# Patient Record
Sex: FEMALE | HISPANIC OR LATINO | Employment: FULL TIME | URBAN - METROPOLITAN AREA
[De-identification: names, ages, dates, MRNs, and addresses within clinical notes are randomized per-mention and may not be internally consistent; named-entity substitution may affect disease eponyms.]

---

## 2020-12-15 ENCOUNTER — OFFICE VISIT (OUTPATIENT)
Dept: URGENT CARE | Age: 40
End: 2020-12-15
Payer: COMMERCIAL

## 2020-12-15 VITALS
BODY MASS INDEX: 24.99 KG/M2 | RESPIRATION RATE: 16 BRPM | TEMPERATURE: 96.5 F | OXYGEN SATURATION: 98 % | HEART RATE: 75 BPM | WEIGHT: 150 LBS | HEIGHT: 65 IN

## 2020-12-15 DIAGNOSIS — B34.9 VIRAL INFECTION: Primary | ICD-10-CM

## 2020-12-15 PROCEDURE — 99213 OFFICE O/P EST LOW 20 MIN: CPT | Performed by: PHYSICIAN ASSISTANT

## 2020-12-15 PROCEDURE — U0003 INFECTIOUS AGENT DETECTION BY NUCLEIC ACID (DNA OR RNA); SEVERE ACUTE RESPIRATORY SYNDROME CORONAVIRUS 2 (SARS-COV-2) (CORONAVIRUS DISEASE [COVID-19]), AMPLIFIED PROBE TECHNIQUE, MAKING USE OF HIGH THROUGHPUT TECHNOLOGIES AS DESCRIBED BY CMS-2020-01-R: HCPCS | Performed by: PHYSICIAN ASSISTANT

## 2020-12-16 LAB — SARS-COV-2 RNA SPEC QL NAA+PROBE: DETECTED

## 2020-12-17 ENCOUNTER — TELEPHONE (OUTPATIENT)
Dept: URGENT CARE | Facility: CLINIC | Age: 40
End: 2020-12-17

## 2023-09-01 ENCOUNTER — ANESTHESIA EVENT (OUTPATIENT)
Dept: PERIOP | Facility: HOSPITAL | Age: 43
End: 2023-09-01
Payer: COMMERCIAL

## 2023-09-01 RX ORDER — CHOLECALCIFEROL (VITAMIN D3) 1250 MCG
1 CAPSULE ORAL WEEKLY
COMMUNITY
Start: 2023-06-15

## 2023-09-01 RX ORDER — DIAPER,BRIEF,ADULT, DISPOSABLE
EACH MISCELLANEOUS
COMMUNITY

## 2023-09-01 RX ORDER — PHENTERMINE HYDROCHLORIDE 37.5 MG/1
37.5 CAPSULE ORAL EVERY MORNING
COMMUNITY

## 2023-09-01 RX ORDER — BUPROPION HYDROCHLORIDE 150 MG/1
150 TABLET ORAL EVERY MORNING
COMMUNITY

## 2023-09-01 NOTE — PRE-PROCEDURE INSTRUCTIONS
Pre-Surgery Instructions:   Medication Instructions   • buPROPion (WELLBUTRIN XL) 150 mg 24 hr tablet May take DOS with sip of water - pt states will be taking after surgery needs to take with food   • Cholecalciferol (Vitamin D3) 1.25 MG (34453 UT) CAPS Stop taking 7 days prior to surgery. • Multiple Vitamins-Minerals (MULTIVITAMIN GUMMIES ADULT PO) Stop taking 7 days prior to surgery. • phentermine 37.5 MG capsule Stop taking 5 days prior to surgery. Pt instructed on use of cleanser -- instructions reviewed with pt - pt did verbalize understanding of these instructions. Pt was seen by PCP at [address] [place] [address] on 8/31/23. Pt with PAT call preferred to speak in [address] [place] [address], however wanted [employer]  on phone line in case of any questions that she may not understand. Used [employer] as needed for PAT call. Medication instructions for day surgery reviewed. Please use only a sip of water to take your instructed medications. Avoid all over the counter vitamins, supplements and NSAIDS for one week prior to surgery per anesthesia guidelines. Tylenol is ok to take as needed. You will receive a call one business day prior to surgery with an arrival time and hospital directions. If your surgery is scheduled on a Monday, the hospital will be calling you on the Friday prior to your surgery. If you have not heard from anyone by 8pm, please call the hospital supervisor through the hospital  at [phone]. Yessica Jiang [phone]). Do not eat or drink anything after midnight the night before your surgery, including candy, mints, lifesavers, or chewing gum. Do not drink alcohol 24hrs before your surgery. Try not to smoke at least 24hrs before your surgery. Follow the pre surgery showering instructions as listed in the [facility] Surgical Experience Booklet” or otherwise provided by your surgeon's office. Do not shave the surgical area 24 hours before surgery.  Do not apply any lotions, creams, including makeup, cologne, deodorant, or perfumes after showering on the day of your surgery. No contact lenses, eye make-up, or artificial eyelashes. Remove nail polish, including gel polish, and any artificial, gel, or acrylic nails if possible. Remove all jewelry including rings and body piercing jewelry. Wear causal clothing that is easy to take on and off. Consider your type of surgery. Keep any valuables, jewelry, piercings at home. Please bring any specially ordered equipment (sling, braces) if indicated. Arrange for a responsible person to drive you to and from the hospital on the day of your surgery. Visitor Guidelines discussed. Call the surgeon's office with any new illnesses, exposures, or additional questions prior to surgery. Please reference your [facility] Surgical Experience Booklet” for additional information to prepare for your upcoming surgery.

## 2023-09-12 PROBLEM — G47.30 SLEEP APNEA: Status: ACTIVE | Noted: 2023-09-12

## 2023-09-12 NOTE — DISCHARGE INSTR - AVS FIRST PAGE
Dr. Ernst Bonds Instructions    1. Take your prescribed medication as directed. 2. Upon arrival at home, lie down and elevate your surgical foot on 2 pillows. 3. Stay off your feet as much as possible for the first 24-48 hours. This is when your feet first swell and may become painful. After 48 hours you may begin following these restrictions:   Nonweightbearing to surgical foot    4. Drink large quantities of water. Consume no alcohol. Continue a well-balanced diet. 5. Report any unusual discomfort or fever to this office. 6. A limited amount of discomfort and swelling is to be expected. In some cases the skin may take on a bruised appearance. The surgical cleansing solution that was applied to your foot prior to the operation is dark in color and the operation site may appear to be oozing when it actually is not. 7. A slight amount of blood is to be expected, and is no cause for alarm. Do not remove the dressings. If there is active bleeding and if the bleeding persists, add additional gauze to the bandage, apply direct pressure, elevate your feet and call this office. 8. Do not get the dressings wet. As regular bathing may be inconvenient, sponge baths are recommended. 9. When anesthesia wears off and if any discomfort should be present, apply an ice pack directly over the operated area for 15 minute intervals for several hours or until the pain leaves. (USE IN EXCESS OF 15 MINUTES COULD CAUSE FROSTBITE). Do not use hot water bags or electric pads. A convenient icepack can be made by placing ice cubes in a plastic bag and covering this with a towel. 10. Take over-the-counter laxative for constipation, this is common with use of narcotic medications.

## 2023-09-13 ENCOUNTER — ANESTHESIA (OUTPATIENT)
Dept: PERIOP | Facility: HOSPITAL | Age: 43
End: 2023-09-13
Payer: COMMERCIAL

## 2023-09-13 ENCOUNTER — HOSPITAL ENCOUNTER (OUTPATIENT)
Facility: HOSPITAL | Age: 43
Setting detail: OUTPATIENT SURGERY
Discharge: HOME | End: 2023-09-13
Attending: PODIATRIST | Admitting: PODIATRIST
Payer: COMMERCIAL

## 2023-09-13 VITALS
TEMPERATURE: 96 F | OXYGEN SATURATION: 98 % | HEART RATE: 61 BPM | RESPIRATION RATE: 18 BRPM | WEIGHT: 159 LBS | DIASTOLIC BLOOD PRESSURE: 79 MMHG | SYSTOLIC BLOOD PRESSURE: 143 MMHG | HEIGHT: 65 IN | BODY MASS INDEX: 26.49 KG/M2

## 2023-09-13 DIAGNOSIS — Z98.890 POST-OPERATIVE STATE: Primary | ICD-10-CM

## 2023-09-13 LAB
EXT PREGNANCY TEST URINE: NEGATIVE
EXT. CONTROL: NORMAL

## 2023-09-13 PROCEDURE — 81025 URINE PREGNANCY TEST: CPT | Performed by: PODIATRIST

## 2023-09-13 RX ORDER — LIDOCAINE HYDROCHLORIDE 20 MG/ML
INJECTION, SOLUTION EPIDURAL; INFILTRATION; INTRACAUDAL; PERINEURAL AS NEEDED
Status: DISCONTINUED | OUTPATIENT
Start: 2023-09-13 | End: 2023-09-13

## 2023-09-13 RX ORDER — FENTANYL CITRATE 50 UG/ML
INJECTION, SOLUTION INTRAMUSCULAR; INTRAVENOUS
Status: COMPLETED | OUTPATIENT
Start: 2023-09-13 | End: 2023-09-13

## 2023-09-13 RX ORDER — PROPOFOL 10 MG/ML
INJECTION, EMULSION INTRAVENOUS CONTINUOUS PRN
Status: DISCONTINUED | OUTPATIENT
Start: 2023-09-13 | End: 2023-09-13

## 2023-09-13 RX ORDER — FENTANYL CITRATE 50 UG/ML
INJECTION, SOLUTION INTRAMUSCULAR; INTRAVENOUS AS NEEDED
Status: DISCONTINUED | OUTPATIENT
Start: 2023-09-13 | End: 2023-09-13

## 2023-09-13 RX ORDER — DEXAMETHASONE SODIUM PHOSPHATE 10 MG/ML
INJECTION, SOLUTION INTRAMUSCULAR; INTRAVENOUS AS NEEDED
Status: DISCONTINUED | OUTPATIENT
Start: 2023-09-13 | End: 2023-09-13

## 2023-09-13 RX ORDER — ROPIVACAINE HYDROCHLORIDE 5 MG/ML
INJECTION, SOLUTION EPIDURAL; INFILTRATION; PERINEURAL
Status: COMPLETED | OUTPATIENT
Start: 2023-09-13 | End: 2023-09-13

## 2023-09-13 RX ORDER — MIDAZOLAM HYDROCHLORIDE 2 MG/2ML
INJECTION, SOLUTION INTRAMUSCULAR; INTRAVENOUS
Status: COMPLETED | OUTPATIENT
Start: 2023-09-13 | End: 2023-09-13

## 2023-09-13 RX ORDER — CEFAZOLIN SODIUM 2 G/50ML
2000 SOLUTION INTRAVENOUS ONCE
Status: COMPLETED | OUTPATIENT
Start: 2023-09-13 | End: 2023-09-13

## 2023-09-13 RX ORDER — MAGNESIUM HYDROXIDE 1200 MG/15ML
LIQUID ORAL AS NEEDED
Status: DISCONTINUED | OUTPATIENT
Start: 2023-09-13 | End: 2023-09-13 | Stop reason: HOSPADM

## 2023-09-13 RX ORDER — LIDOCAINE HYDROCHLORIDE 10 MG/ML
INJECTION, SOLUTION EPIDURAL; INFILTRATION; INTRACAUDAL; PERINEURAL AS NEEDED
Status: DISCONTINUED | OUTPATIENT
Start: 2023-09-13 | End: 2023-09-13 | Stop reason: HOSPADM

## 2023-09-13 RX ORDER — SODIUM CHLORIDE, SODIUM LACTATE, POTASSIUM CHLORIDE, CALCIUM CHLORIDE 600; 310; 30; 20 MG/100ML; MG/100ML; MG/100ML; MG/100ML
50 INJECTION, SOLUTION INTRAVENOUS CONTINUOUS
Status: DISCONTINUED | OUTPATIENT
Start: 2023-09-13 | End: 2023-09-13 | Stop reason: HOSPADM

## 2023-09-13 RX ORDER — TRIAMCINOLONE ACETONIDE 40 MG/ML
INJECTION, SUSPENSION INTRA-ARTICULAR; INTRAMUSCULAR AS NEEDED
Status: DISCONTINUED | OUTPATIENT
Start: 2023-09-13 | End: 2023-09-13 | Stop reason: HOSPADM

## 2023-09-13 RX ORDER — PROPOFOL 10 MG/ML
INJECTION, EMULSION INTRAVENOUS AS NEEDED
Status: DISCONTINUED | OUTPATIENT
Start: 2023-09-13 | End: 2023-09-13

## 2023-09-13 RX ORDER — ACETAMINOPHEN 325 MG/1
650 TABLET ORAL EVERY 4 HOURS PRN
Status: DISCONTINUED | OUTPATIENT
Start: 2023-09-13 | End: 2023-09-13 | Stop reason: HOSPADM

## 2023-09-13 RX ORDER — CHLORHEXIDINE GLUCONATE ORAL RINSE 1.2 MG/ML
15 SOLUTION DENTAL ONCE
Status: COMPLETED | OUTPATIENT
Start: 2023-09-13 | End: 2023-09-13

## 2023-09-13 RX ORDER — ONDANSETRON 2 MG/ML
INJECTION INTRAMUSCULAR; INTRAVENOUS AS NEEDED
Status: DISCONTINUED | OUTPATIENT
Start: 2023-09-13 | End: 2023-09-13

## 2023-09-13 RX ORDER — OXYCODONE HYDROCHLORIDE AND ACETAMINOPHEN 5; 325 MG/1; MG/1
1 TABLET ORAL EVERY 4 HOURS PRN
Status: DISCONTINUED | OUTPATIENT
Start: 2023-09-13 | End: 2023-09-13 | Stop reason: HOSPADM

## 2023-09-13 RX ADMIN — DEXAMETHASONE SODIUM PHOSPHATE 10 MG: 10 INJECTION, SOLUTION INTRAMUSCULAR; INTRAVENOUS at 09:35

## 2023-09-13 RX ADMIN — LIDOCAINE HYDROCHLORIDE 80 MG: 20 INJECTION, SOLUTION EPIDURAL; INFILTRATION; INTRACAUDAL; PERINEURAL at 08:56

## 2023-09-13 RX ADMIN — ONDANSETRON 4 MG: 2 INJECTION INTRAMUSCULAR; INTRAVENOUS at 09:35

## 2023-09-13 RX ADMIN — SODIUM CHLORIDE, SODIUM LACTATE, POTASSIUM CHLORIDE, AND CALCIUM CHLORIDE 50 ML/HR: .6; .31; .03; .02 INJECTION, SOLUTION INTRAVENOUS at 08:01

## 2023-09-13 RX ADMIN — FENTANYL CITRATE 50 MCG: 50 INJECTION, SOLUTION INTRAMUSCULAR; INTRAVENOUS at 09:15

## 2023-09-13 RX ADMIN — MIDAZOLAM 2 MG: 1 INJECTION INTRAMUSCULAR; INTRAVENOUS at 08:20

## 2023-09-13 RX ADMIN — FENTANYL CITRATE 50 MCG: 50 INJECTION, SOLUTION INTRAMUSCULAR; INTRAVENOUS at 08:20

## 2023-09-13 RX ADMIN — PROPOFOL 80 MCG/KG/MIN: 10 INJECTION, EMULSION INTRAVENOUS at 08:56

## 2023-09-13 RX ADMIN — CEFAZOLIN SODIUM 2000 MG: 2 SOLUTION INTRAVENOUS at 08:56

## 2023-09-13 RX ADMIN — ROPIVACAINE HYDROCHLORIDE 20 ML: 5 INJECTION EPIDURAL; INFILTRATION; PERINEURAL at 08:25

## 2023-09-13 RX ADMIN — CHLORHEXIDINE GLUCONATE 0.12% ORAL RINSE 15 ML: 1.2 LIQUID ORAL at 07:53

## 2023-09-13 RX ADMIN — OXYCODONE HYDROCHLORIDE AND ACETAMINOPHEN 1 TABLET: 5; 325 TABLET ORAL at 10:22

## 2023-09-13 RX ADMIN — PROPOFOL 40 MG: 10 INJECTION, EMULSION INTRAVENOUS at 08:56

## 2023-09-13 NOTE — INTERVAL H&P NOTE
H&P reviewed. After examining the patient I find no changes in the patients condition since the H&P had been written.     Vitals:    09/13/23 0734   BP: 125/81   Pulse: 74   Resp: 18   Temp: 98.1 °F (36.7 °C)   SpO2: 100%

## 2023-09-13 NOTE — ANESTHESIA PREPROCEDURE EVALUATION
Procedure:  RECESSION GASTROCNEMIUS OPEN (Right: Leg Lower)  RELEASE FASCIA PLANTAR/FASCIOTOMY OPEN (Right: Foot)    Relevant Problems   CARDIO (within normal limits)      PULMONARY   (+) Sleep apnea   (-) Asthma   (-) Smoking        Physical Exam    Airway    Mallampati score: II  TM Distance: >3 FB  Neck ROM: full     Dental       Cardiovascular  Cardiovascular exam normal    Pulmonary  Pulmonary exam normal     Other Findings        Anesthesia Plan  ASA Score- 2     Anesthesia Type- IV sedation with anesthesia with ASA Monitors. Additional Monitors:   Airway Plan:     Comment: Pop block . Plan Factors-Exercise tolerance (METS): >4 METS. Chart reviewed. EKG reviewed. Existing labs reviewed. Patient summary reviewed. Patient is not a current smoker. Patient not instructed to abstain from smoking on day of procedure. Patient did not smoke on day of surgery. Induction- intravenous. Postoperative Plan- Plan for postoperative opioid use. Informed Consent- Anesthetic plan and risks discussed with patient and spouse. I personally reviewed this patient with the CRNA. Discussed and agreed on the Anesthesia Plan with the CRNA. .        Lab Results   Component Value Date    HGBA1C 5.6 10/05/2022       No results found for: "NA", "K", "CL", "CO2", "ANIONGAP", "BUN", "CREATININE", "GLUCOSE", "GLUF", "CALCIUM", "CORRECTEDCA", "AST", "ALT", "ALKPHOS", "PROT", "BILITOT", "EGFR"      Cbc and bmp reviewed

## 2023-09-13 NOTE — DISCHARGE SUMMARY
Discharge Summary Outpatient Procedure Podiatry -   Mylene Reeves 39 y.o. female MRN: [id number]  Unit/Bed#: OR POOL Encounter: [id number]    Admission Date: 9/13/2023     Admitting Diagnosis: Plantar fascial fibromatosis [M72.2]  Contracture, right ankle [M24.571]    Discharge Diagnosis: same    Procedures Performed: RECESSION GASTROCNEMIUS OPEN: 17003 (CPT®)  RELEASE FASCIA PLANTAR/FASCIOTOMY OPEN:     Complications: none    Condition at Discharge: stable    Discharge instructions/Information to patient and family:   See after visit summary for information provided to patient and family. Provisions for Follow-Up Care/Important appointments:  See after visit summary for information related to follow-up care and any pertinent home health orders. Discharge Medications:  See after visit summary for reconciled discharge medications provided to patient and family.

## 2023-09-13 NOTE — ANESTHESIA PROCEDURE NOTES
Peripheral Block    Patient location during procedure: holding area  Start time: 9/13/2023 8:15 AM  Reason for block: at surgeon's request and post-op pain management  Staffing  Performed by: Chani Fragoso MD  Authorized by: Chani Fragoso MD    Preanesthetic Checklist  Completed: patient identified, IV checked, site marked, risks and benefits discussed, surgical consent, monitors and equipment checked, pre-op evaluation and timeout performed  Peripheral Block  Patient position: prone. Prep: ChloraPrep  Patient monitoring: frequent blood pressure checks, continuous pulse oximetry and heart rate  Block type: Popliteal  Laterality: right  Injection technique: single-shot  Procedures: ultrasound guided, Ultrasound guidance required for the procedure to increase accuracy and safety of medication placement and decrease risk of complications.   Ultrasound permanent image savedropivacaine (NAROPIN) 0.5 % injection 20 mL - Perineural   20 mL - 9/13/2023 8:25:00 AM  fentanyl citrate (PF) 100 MCG/2ML 50 mcg - Intravenous   50 mcg - 9/13/2023 8:20:00 AM  midazolam (VERSED) injection 0.5 mg - Intravenous   2 mg - 9/13/2023 8:20:00 AM  Needle  Needle type: Stimuplex   Needle gauge: 20 G  Needle localization: anatomical landmarks and ultrasound guidance  Assessment  Injection assessment: incremental injection, frequent aspiration, injected with ease, negative aspiration, negative for heart rate change, no paresthesia on injection, no symptoms of intraneural/intravenous injection and needle tip visualized at all times  Paresthesia pain: none  Post-procedure:  site cleaned  patient tolerated the procedure well with no immediate complications

## 2023-09-13 NOTE — OP NOTE
OPERATIVE REPORT - Podiatry  PATIENT NAME: Danilo Samaniego    :  1980  MRN: [id number]  Pt Location:  OR ROOM 12    SURGERY DATE: 2023    Surgeon(s) and Role:     * Reba Ojeda DPM - Primary     * Juvenal Cordero DPM - Assisting    Pre-op Diagnosis:  Plantar fascial fibromatosis [M72.2]  Contracture, right ankle [M24.571]    Post-Op Diagnosis Codes:     * Plantar fascial fibromatosis [M72.2]     * Contracture, right ankle [M24.571]    Procedure(s) (LRB):  RECESSION GASTROCNEMIUS OPEN (Right)  RELEASE FASCIA PLANTAR/FASCIOTOMY OPEN (Right)    Specimen(s):  * No specimens in log *    Estimated Blood Loss:   Minimal    Drains:  * No LDAs found *    Anesthesia Type:   General w/ Popliteal Block with 10 ml of 1% Lidocaine    Hemostasis:  Pneumatic thigh tourniquet set at 300 mmHg for 28 mins  Direct compression, electrocautery    Materials:  3-0 Vicryl  4-0 nylon    Injectables:  None    Operative Findings: Following gastroc recession there was noted adequate reduction and equinus deformity    Complications:   None    Procedure and Technique:   A popliteal block was performed by anesthesia team prior to the OR. Under mild sedation, the patient was brought into the operating room and placed on the operating room table in the supine position. IV sedation was achieved by anesthesia team and a universal timeout was performed where all parties are in agreement of correct patient, correct procedure and correct site. A pneumatic tourniquet was then placed over the patient's right lower extremity with ample padding. An esmarch bandage was used to exsangunate the foot and the pneumatic tourniquet was then inflated to 300 mmHg. The foot was then prepped and draped in the usual aseptic manner. Attention was then directed to right medial calf. Using a 15 blade a 6 cm incision was made over the central medial aspect of the cath through the skin.   Blunt dissection was then carried through the subcutaneous tissue down to the level of deep fascia, retracting all vital neurovascular structures on the way and cauterizing any deep bleeding vessels. The fascia was then incised using a 15 blade to expose the gastrocnemius and soleal complex. Using a saline soaked Ray-Tenisha to the aponeurosis between the gastroc and soleus muscle was identified and  to allow for visualization of the myotendinous junction. A laminar  was then used to retract both muscle bellies out of the way and allow for visualization of the myotendinous junction. Using a 15 blade the myotendinous junction of the gastroc and soleus complex was incised and released. The foot was then placed in a dorsiflexed position and reduction of the equinus deformity was noted. The surgical incision was copiously flushed with NSS. Deep closure was achieved using 3-0 Vicryl. Subcuticular closure was done with 3-0 Vicryl. Skin closure was achieved with 4-0 nylon in a running interlocking fashion. Attention was then directed to the right heel where a 2.5 cm incision was effected in a transverse manner through full skin thickness with a #15 blade within relaxed skin tension lines anterior to the heel tuber over the medial course of the plantar aponeurosis. Further dissection through subcutaneous tissue down to the plantar fascial band was obtained utilizing Metzenbaum scissors. With adequate retraction, the medial plantar aponeurosis was brought into view and was incised with a #15 blade down to muscle belly. The medial aponeurosis was noted to be hypertrophied consistent with plantar fasciitis. A portion of the proximal section was clamped and resected for fasciectomy. Preoperative tension along the medial band was noted to be resolved. The wound was copiously irrigated with normal sterile saline solution. Closure was achieved with 4-0 nylon interrupted horizontal.     The foot was then cleansed and dried.  A postoperative injection consisting of 10 ml of 1% Lidocaine with kenalog 40 was performed. The incision site was dressed with betadine soaked adaptic, gauze. This was then covered with a Kerlex, webril and an ACE wrap then webril and another ACE wrap. The tourniquet was deflated at approximately 28 min and normal hyperemic response was noted to all digits. The patient tolerated the procedure and anesthesia well without immediate complications and transferred to PACU with vital signs stable. Dr. Danielle Ortega was present during the entire procedure and participated in all key aspects. SIGNATURE: Sandy Fair DPM  DATE: September 13, 2023  TIME: 9:56 AM      Portions of the record may have been created with voice recognition software. Occasional wrong word or "sound a like" substitutions may have occurred due to the inherent limitations of voice recognition software. Read the chart carefully and recognize, using context, where substitutions have occurred.

## 2024-02-05 ENCOUNTER — HOSPITAL ENCOUNTER (EMERGENCY)
Facility: HOSPITAL | Age: 44
Discharge: HOME | End: 2024-02-05
Attending: EMERGENCY MEDICINE | Admitting: EMERGENCY MEDICINE
Payer: COMMERCIAL

## 2024-02-05 VITALS
DIASTOLIC BLOOD PRESSURE: 96 MMHG | SYSTOLIC BLOOD PRESSURE: 173 MMHG | OXYGEN SATURATION: 100 % | WEIGHT: 162.92 LBS | BODY MASS INDEX: 27.11 KG/M2 | HEART RATE: 88 BPM | RESPIRATION RATE: 18 BRPM | TEMPERATURE: 98.2 F

## 2024-02-05 DIAGNOSIS — R03.0 ELEVATED BLOOD PRESSURE READING: Primary | ICD-10-CM

## 2024-02-05 PROCEDURE — 99283 EMERGENCY DEPT VISIT LOW MDM: CPT

## 2024-02-05 PROCEDURE — 99283 EMERGENCY DEPT VISIT LOW MDM: CPT | Performed by: EMERGENCY MEDICINE

## 2024-02-05 NOTE — ED PROVIDER NOTES
"History  Chief Complaint   Patient presents with    Hypertension     Pt reports HBP reading at podiatrist today.  Has been having faint headaches recently.  Denies other symptoms.      44 y.o. F p/w high BP reading.  Pt was at podiatrist's office today and BP was in 150s and rechecked in 160s.  Reports intermittent \"little\" headaches. Denies CP, changes in vision, SOB, abd pain, N/V, focal deficits.  Pt does not have a PCP.      History provided by:  Patient and relative  Hypertension  Associated symptoms: headaches (\"sometimes\")    Associated symptoms: no abdominal pain, no chest pain, no nausea, no shortness of breath and not vomiting        Prior to Admission Medications   Prescriptions Last Dose Informant Patient Reported? Taking?   Cholecalciferol (Vitamin D3) 1.25 MG (57451 UT) CAPS   Yes No   Sig: Take 1 capsule by mouth once a week   Lecithin 1200 MG CAPS   Yes No   Sig: Take by mouth   Multiple Vitamins-Minerals (MULTIVITAMIN GUMMIES ADULT PO)   Yes No   Sig: Take by mouth daily   buPROPion (WELLBUTRIN XL) 150 mg 24 hr tablet   Yes No   Sig: Take 150 mg by mouth every morning   phentermine 37.5 MG capsule   Yes No   Sig: Take 37.5 mg by mouth every morning 9/1/23 Pt instructed to hold 5 days prior to surgery      Facility-Administered Medications: None       Past Medical History:   Diagnosis Date    Asthma     childhood asthma -resolved at this time    CPAP (continuous positive airway pressure) dependence     Sleep apnea        Past Surgical History:   Procedure Laterality Date    CHOLECYSTECTOMY      PLANTAR FASCIA SURGERY Right 9/13/2023    Procedure: RELEASE FASCIA PLANTAR/FASCIOTOMY OPEN;  Surgeon: Paxton Malloy DPM;  Location:  MAIN OR;  Service: Podiatry    GA GASTROCNEMIUS RECESSION Right 9/13/2023    Procedure: RECESSION GASTROCNEMIUS OPEN;  Surgeon: Paxton Malloy DPM;  Location:  MAIN OR;  Service: Podiatry       Family History   Problem Relation Age of Onset    Anesthesia problems Neg Hx  " "    I have reviewed and agree with the history as documented.    E-Cigarette/Vaping    Comments Denies any use per pt      E-Cigarette/Vaping Substances     Social History     Tobacco Use    Smoking status: Never    Smokeless tobacco: Never    Tobacco comments:     Never a smoker or use of any tobacco products    Substance Use Topics    Alcohol use: Yes     Comment: social -       Review of Systems   Eyes:  Negative for photophobia and visual disturbance.   Respiratory:  Negative for shortness of breath.    Cardiovascular:  Negative for chest pain.   Gastrointestinal:  Negative for abdominal pain, nausea and vomiting.   Neurological:  Positive for headaches (\"sometimes\").       Physical Exam  Physical Exam  Vitals and nursing note reviewed.   Constitutional:       General: She is not in acute distress.     Appearance: She is well-developed. She is not ill-appearing, toxic-appearing or diaphoretic.   HENT:      Head: Normocephalic and atraumatic.   Eyes:      General: No scleral icterus.     Conjunctiva/sclera:      Right eye: Right conjunctiva is not injected.      Left eye: Left conjunctiva is not injected.   Neck:      Vascular: No JVD.      Trachea: Trachea normal.   Cardiovascular:      Rate and Rhythm: Normal rate and regular rhythm.      Pulses: Normal pulses.      Heart sounds: Normal heart sounds. No murmur heard.     No friction rub.   Pulmonary:      Effort: Pulmonary effort is normal. No accessory muscle usage or respiratory distress.      Breath sounds: Normal breath sounds. No stridor. No wheezing, rhonchi or rales.   Chest:      Chest wall: No tenderness.   Abdominal:      General: There is no distension.      Palpations: Abdomen is soft. Abdomen is not rigid.      Tenderness: There is no abdominal tenderness. There is no guarding or rebound.   Musculoskeletal:      Cervical back: Normal range of motion.   Skin:     General: Skin is warm and dry.      Coloration: Skin is not pale.      Findings: No " rash.   Neurological:      Mental Status: She is alert.      GCS: GCS eye subscore is 4. GCS verbal subscore is 5. GCS motor subscore is 6.      Motor: No weakness (Grossly intact).         Vital Signs  ED Triage Vitals [02/05/24 1714]   Temperature Pulse Respirations Blood Pressure SpO2   98.2 °F (36.8 °C) 88 18 (!) 173/96 100 %      Temp Source Heart Rate Source Patient Position - Orthostatic VS BP Location FiO2 (%)   Oral Monitor Lying Left arm --      Pain Score       --           Vitals:    02/05/24 1714   BP: (!) 173/96   Pulse: 88   Patient Position - Orthostatic VS: Lying         Visual Acuity      ED Medications  Medications - No data to display    Diagnostic Studies  Results Reviewed       None                   No orders to display              Procedures  Procedures         ED Course                                             Medical Decision Making  Elevated BP readings - Will refer to family medicine for f/u.             Disposition  Final diagnoses:   Elevated blood pressure reading     Time reflects when diagnosis was documented in both MDM as applicable and the Disposition within this note       Time User Action Codes Description Comment    2/5/2024  6:22 PM Gracia Parikh Add [R03.0] Elevated blood pressure reading           ED Disposition       ED Disposition   Discharge    Condition   Stable    Date/Time   Mon Feb 5, 2024  6:23 PM    Comment   Nancy Marr discharge to home/self care.                   Follow-up Information       Follow up With Specialties Details Why Contact Info Additional Information    [facility] Schedule an appointment as soon as possible for a visit   [address] [place] [id number] [phone] [facility], [address] [place] [address], [address] [phone]            Patient's Medications   Discharge Prescriptions    No  medications on file           PDMP Review         Value Time User    PDMP Reviewed  Yes 9/13/2023  9:53 AM Lam Elizondo DPM            ED Provider  Electronically Signed by             Gracia Parikh DO  02/05/24 6211

## 2024-02-21 ENCOUNTER — OFFICE VISIT (OUTPATIENT)
Dept: FAMILY MEDICINE CLINIC | Facility: CLINIC | Age: 44
End: 2024-02-21
Payer: COMMERCIAL

## 2024-02-21 VITALS
BODY MASS INDEX: 26.49 KG/M2 | SYSTOLIC BLOOD PRESSURE: 130 MMHG | HEIGHT: 65 IN | HEART RATE: 96 BPM | RESPIRATION RATE: 16 BRPM | WEIGHT: 159 LBS | OXYGEN SATURATION: 98 % | DIASTOLIC BLOOD PRESSURE: 88 MMHG | TEMPERATURE: 98 F

## 2024-02-21 DIAGNOSIS — Z00.00 HEALTHCARE MAINTENANCE: ICD-10-CM

## 2024-02-21 DIAGNOSIS — R03.0 ELEVATED BLOOD PRESSURE READING IN OFFICE WITHOUT DIAGNOSIS OF HYPERTENSION: Primary | ICD-10-CM

## 2024-02-21 DIAGNOSIS — R03.0 ELEVATED BLOOD PRESSURE READING: ICD-10-CM

## 2024-02-21 PROCEDURE — 99396 PREV VISIT EST AGE 40-64: CPT | Performed by: NURSE PRACTITIONER

## 2024-02-21 PROCEDURE — 99203 OFFICE O/P NEW LOW 30 MIN: CPT | Performed by: NURSE PRACTITIONER

## 2024-02-21 NOTE — PROGRESS NOTES
Name: Nancy Marr      : 1980      MRN: [id number]  Encounter Provider: ANJU Moscoso  Encounter Date: 2024   Encounter department: [facility]    Assessment & Plan     1. Elevated blood pressure reading in office without diagnosis of hypertension  Assessment & Plan:  - Advised to monitor blood pressure at home and record readings.   - Recommend regular exercise and healthy diet low in sodium.  - Recommend follow up in 1 month.     Orders:  -     CBC and differential; Future  -     Comprehensive metabolic panel; Future  -     Lipid Panel with Direct LDL reflex; Future  -     TSH, 3rd generation with Free T4 reflex; Future    2. Healthcare maintenance  Assessment & Plan:  - Reviewed immunizations and screenings.  - UTD with dental, vision, and GYN exams.   - UTD with mammogram.   - Routine labs ordered.     Orders:  -     CBC and differential; Future  -     Comprehensive metabolic panel; Future  -     Lipid Panel with Direct LDL reflex; Future  -     TSH, 3rd generation with Free T4 reflex; Future    3. Elevated blood pressure reading  -     Ambulatory Referral to Family Practice        Depression Screening and Follow-up Plan: Patient was screened for depression during today's encounter. They screened negative with a PHQ-2 score of 0.        Subjective     Patient presents to office today to establish care. She has no significant PMH. Currently taking phentermine and wellbutrin for weight loss. She has concerns today regarding elevated blood pressure readings. Was being seen by Podiatry for plantar fasciitis and BP was elevated during office visit. She has been checking her blood pressure at home and reports readings of 120-130s systolic and 80-90s diastolic. Also complains of headaches which usually occur after she gets home from work. Denies any sensitivity to light or sound. Takes Advil OTC occasionally which provides relief. She denies any other concerns or  complaints today.           Review of Systems   Constitutional:  Negative for fatigue and fever.   HENT:  Negative for congestion, postnasal drip and trouble swallowing.    Eyes:  Negative for visual disturbance.   Respiratory:  Negative for cough and shortness of breath.    Cardiovascular:  Negative for chest pain and palpitations.   Gastrointestinal:  Negative for abdominal pain and blood in stool.   Endocrine: Negative for cold intolerance and heat intolerance.   Genitourinary:  Negative for difficulty urinating, dysuria and frequency.   Musculoskeletal:  Negative for gait problem.   Skin:  Negative for rash.   Neurological:  Positive for headaches. Negative for dizziness and syncope.   Hematological:  Negative for adenopathy.   Psychiatric/Behavioral:  Negative for behavioral problems.        Past Medical History:   Diagnosis Date   • Asthma     childhood asthma -resolved at this time   • CPAP (continuous positive airway pressure) dependence    • Sleep apnea      Past Surgical History:   Procedure Laterality Date   • CHOLECYSTECTOMY     • PLANTAR FASCIA SURGERY Right 9/13/2023    Procedure: RELEASE FASCIA PLANTAR/FASCIOTOMY OPEN;  Surgeon: Paxton Malloy DPM;  Location: [facility];  Service: Podiatry   • [place] GASTROCNEMIUS RECESSION Right 9/13/2023    Procedure: RECESSION GASTROCNEMIUS OPEN;  Surgeon: Paxton Malloy DPM;  Location:  MAIN OR;  Service: Podiatry     Family History   Problem Relation Age of Onset   • Anesthesia problems Neg Hx      Social History     Socioeconomic History   • Marital status: /Civil Union     Spouse name: None   • Number of children: None   • Years of education: None   • Highest education level: None   Occupational History   • None   Tobacco Use   • Smoking status: Never   • Smokeless tobacco: Never   • Tobacco comments:     Never a smoker or use of any tobacco products    Vaping Use   • Vaping status: Never Used   Substance and Sexual Activity   • Alcohol use: Yes     Comment:  "social -   • Drug use: Never     Comment: Denies any drug use per pt   • Sexual activity: Yes     Comment: Denies any chest pain or shortness of breath with activity   Other Topics Concern   • None   Social History Narrative   • None     Social Determinants of Health     Financial Resource Strain: Not on file   Food Insecurity: Not on file   Transportation Needs: Not on file   Physical Activity: Not on file   Stress: Not on file   Social Connections: Not on file   Intimate Partner Violence: Not on file   Housing Stability: Not on file     Current Outpatient Medications on File Prior to Visit   Medication Sig   • buPROPion (WELLBUTRIN XL) 150 mg 24 hr tablet Take 150 mg by mouth every morning   • Cholecalciferol (Vitamin D3) 1.25 MG (94277 UT) CAPS Take 1 capsule by mouth once a week   • phentermine 37.5 MG capsule Take 37.5 mg by mouth every morning 9/1/23 Pt instructed to hold 5 days prior to surgery   • Lecithin 1200 MG CAPS Take by mouth   • Multiple Vitamins-Minerals (MULTIVITAMIN GUMMIES ADULT PO) Take by mouth daily (Patient not taking: Reported on 2/21/2024)     Allergies   Allergen Reactions   • Dipyrone Other (See Comments)     Facial swelling. NB: this is a NSAID that causes agranulcytosis and is not used anymore.     Immunization History   Administered Date(s) Administered   • COVID-19 J&J (BackType) vaccine 0.5 mL 04/06/2021   • COVID-19 PFIZER VACCINE 0.3 ML IM 12/30/2021   • HPV9 07/07/2023   • Hep B, adult 05/06/2023, 06/27/2023   • INFLUENZA 11/12/2012, 10/15/2013, 11/13/2014, 10/14/2015, 10/14/2015, 10/08/2022   • Influenza, injectable, quadrivalent, preservative free 0.5 mL 09/01/2020   • Tdap 06/20/2022       Objective     /88 (BP Location: Left arm, Patient Position: Sitting, Cuff Size: Adult)   Pulse 96   Temp 98 °F (36.7 °C) (Tympanic)   Resp 16   Ht 5' 5\" (1.651 m)   Wt 72.1 kg (159 lb)   SpO2 98%   BMI 26.46 kg/m²     Physical Exam  Vitals and nursing note reviewed. "   Constitutional:       General: She is not in acute distress.     Appearance: Normal appearance. She is not ill-appearing.   HENT:      Head: Normocephalic and atraumatic.      Right Ear: Tympanic membrane, ear canal and external ear normal.      Left Ear: Tympanic membrane, ear canal and external ear normal.      Nose: Nose normal.      Mouth/Throat:      Mouth: Mucous membranes are moist.      Pharynx: No posterior oropharyngeal erythema.   Eyes:      Conjunctiva/sclera: Conjunctivae normal.      Pupils: Pupils are equal, round, and reactive to light.   Cardiovascular:      Rate and Rhythm: Normal rate and regular rhythm.      Heart sounds: Normal heart sounds.   Pulmonary:      Effort: Pulmonary effort is normal.      Breath sounds: Normal breath sounds.   Abdominal:      General: Bowel sounds are normal.      Palpations: Abdomen is soft.   Musculoskeletal:         General: Normal range of motion.      Cervical back: Normal range of motion.   Lymphadenopathy:      Cervical: No cervical adenopathy.   Skin:     General: Skin is warm and dry.   Neurological:      Mental Status: She is alert and oriented to person, place, and time.   Psychiatric:         Mood and Affect: Mood normal.         Behavior: Behavior normal.       ANJU Moscoso

## 2024-02-21 NOTE — ASSESSMENT & PLAN NOTE
- Advised to monitor blood pressure at home and record readings.   - Recommend regular exercise and healthy diet low in sodium.  - Recommend follow up in 1 month.

## 2024-02-21 NOTE — ASSESSMENT & PLAN NOTE
- Reviewed immunizations and screenings.  - UTD with dental, vision, and GYN exams.   - UTD with mammogram.   - Routine labs ordered.

## 2024-02-28 ENCOUNTER — APPOINTMENT (OUTPATIENT)
Dept: LAB | Age: 44
End: 2024-02-28
Payer: COMMERCIAL

## 2024-02-28 DIAGNOSIS — R03.0 ELEVATED BLOOD PRESSURE READING IN OFFICE WITHOUT DIAGNOSIS OF HYPERTENSION: ICD-10-CM

## 2024-02-28 DIAGNOSIS — Z00.00 HEALTHCARE MAINTENANCE: ICD-10-CM

## 2024-02-28 LAB
ALBUMIN SERPL BCP-MCNC: 4.1 G/DL (ref 3.5–5)
ALP SERPL-CCNC: 55 U/L (ref 34–104)
ALT SERPL W P-5'-P-CCNC: 15 U/L (ref 7–52)
ANION GAP SERPL CALCULATED.3IONS-SCNC: 7 MMOL/L
AST SERPL W P-5'-P-CCNC: 16 U/L (ref 13–39)
BASOPHILS # BLD AUTO: 0.05 THOUSANDS/ÂΜL (ref 0–0.1)
BASOPHILS NFR BLD AUTO: 1 % (ref 0–1)
BILIRUB SERPL-MCNC: 0.57 MG/DL (ref 0.2–1)
BUN SERPL-MCNC: 7 MG/DL (ref 5–25)
CALCIUM SERPL-MCNC: 8.8 MG/DL (ref 8.4–10.2)
CHLORIDE SERPL-SCNC: 105 MMOL/L (ref 96–108)
CHOLEST SERPL-MCNC: 194 MG/DL
CO2 SERPL-SCNC: 27 MMOL/L (ref 21–32)
CREAT SERPL-MCNC: 0.72 MG/DL (ref 0.6–1.3)
EOSINOPHIL # BLD AUTO: 0.12 THOUSAND/ÂΜL (ref 0–0.61)
EOSINOPHIL NFR BLD AUTO: 2 % (ref 0–6)
ERYTHROCYTE [DISTWIDTH] IN BLOOD BY AUTOMATED COUNT: 12.3 % (ref 11.6–15.1)
GFR SERPL CREATININE-BSD FRML MDRD: 101 ML/MIN/1.73SQ M
GLUCOSE P FAST SERPL-MCNC: 81 MG/DL (ref 65–99)
HCT VFR BLD AUTO: 42.2 % (ref 34.8–46.1)
HDLC SERPL-MCNC: 46 MG/DL
HGB BLD-MCNC: 13.4 G/DL (ref 11.5–15.4)
IMM GRANULOCYTES # BLD AUTO: 0.03 THOUSAND/UL (ref 0–0.2)
IMM GRANULOCYTES NFR BLD AUTO: 0 % (ref 0–2)
LDLC SERPL CALC-MCNC: 123 MG/DL (ref 0–100)
LYMPHOCYTES # BLD AUTO: 2.95 THOUSANDS/ÂΜL (ref 0.6–4.47)
LYMPHOCYTES NFR BLD AUTO: 39 % (ref 14–44)
MCH RBC QN AUTO: 30.4 PG (ref 26.8–34.3)
MCHC RBC AUTO-ENTMCNC: 31.8 G/DL (ref 31.4–37.4)
MCV RBC AUTO: 96 FL (ref 82–98)
MONOCYTES # BLD AUTO: 0.56 THOUSAND/ÂΜL (ref 0.17–1.22)
MONOCYTES NFR BLD AUTO: 7 % (ref 4–12)
NEUTROPHILS # BLD AUTO: 3.89 THOUSANDS/ÂΜL (ref 1.85–7.62)
NEUTS SEG NFR BLD AUTO: 51 % (ref 43–75)
NRBC BLD AUTO-RTO: 0 /100 WBCS
PLATELET # BLD AUTO: 359 THOUSANDS/UL (ref 149–390)
PMV BLD AUTO: 11.5 FL (ref 8.9–12.7)
POTASSIUM SERPL-SCNC: 4.2 MMOL/L (ref 3.5–5.3)
PROT SERPL-MCNC: 6.5 G/DL (ref 6.4–8.4)
RBC # BLD AUTO: 4.41 MILLION/UL (ref 3.81–5.12)
SODIUM SERPL-SCNC: 139 MMOL/L (ref 135–147)
TRIGL SERPL-MCNC: 125 MG/DL
TSH SERPL DL<=0.05 MIU/L-ACNC: 1.09 UIU/ML (ref 0.45–4.5)
WBC # BLD AUTO: 7.6 THOUSAND/UL (ref 4.31–10.16)

## 2024-02-28 PROCEDURE — 80061 LIPID PANEL: CPT

## 2024-02-28 PROCEDURE — 80053 COMPREHEN METABOLIC PANEL: CPT

## 2024-02-28 PROCEDURE — 36415 COLL VENOUS BLD VENIPUNCTURE: CPT

## 2024-02-28 PROCEDURE — 85025 COMPLETE CBC W/AUTO DIFF WBC: CPT

## 2024-02-28 PROCEDURE — 84443 ASSAY THYROID STIM HORMONE: CPT

## 2024-03-01 ENCOUNTER — TELEPHONE (OUTPATIENT)
Age: 44
End: 2024-03-01

## 2024-03-01 ENCOUNTER — TELEPHONE (OUTPATIENT)
Dept: FAMILY MEDICINE CLINIC | Facility: CLINIC | Age: 44
End: 2024-03-01

## 2024-03-01 NOTE — TELEPHONE ENCOUNTER
----- Message from ANJU Moscoso sent at 2/29/2024 11:06 AM EST -----  Labs show slightly elevated cholesterol. Recommend healthy diet and regular exercise. The rest of her labs were normal.

## 2024-03-01 NOTE — TELEPHONE ENCOUNTER
"Received call from patient to review lab results. Gave her the following information per Rupa RENE:  \"Labs show slightly elevated cholesterol. Recommend healthy diet and regular exercise. The rest of her labs were normal. \"    No further questions offered.  "

## 2024-03-20 ENCOUNTER — OFFICE VISIT (OUTPATIENT)
Dept: FAMILY MEDICINE CLINIC | Facility: CLINIC | Age: 44
End: 2024-03-20
Payer: COMMERCIAL

## 2024-03-20 VITALS
RESPIRATION RATE: 16 BRPM | HEIGHT: 65 IN | BODY MASS INDEX: 26.66 KG/M2 | DIASTOLIC BLOOD PRESSURE: 80 MMHG | SYSTOLIC BLOOD PRESSURE: 132 MMHG | OXYGEN SATURATION: 98 % | HEART RATE: 91 BPM | WEIGHT: 160 LBS | TEMPERATURE: 97.4 F

## 2024-03-20 DIAGNOSIS — I10 HYPERTENSION, UNSPECIFIED TYPE: Primary | ICD-10-CM

## 2024-03-20 PROCEDURE — 99214 OFFICE O/P EST MOD 30 MIN: CPT | Performed by: NURSE PRACTITIONER

## 2024-03-20 RX ORDER — LISINOPRIL 5 MG/1
5 TABLET ORAL DAILY
Qty: 90 TABLET | Refills: 0 | Status: SHIPPED | OUTPATIENT
Start: 2024-03-20

## 2024-03-20 NOTE — ASSESSMENT & PLAN NOTE
- Not well controlled.  - Will start patient on lisinopril 5 mg daily. Discussed side effects.  - Continue to monitor blood pressure at home.   - Recommend follow up in 1 month.

## 2024-03-20 NOTE — PROGRESS NOTES
Name: Nancy Marr      : 1980      MRN: [id number]  Encounter Provider: ANJU Moscoso  Encounter Date: 3/20/2024   Encounter department: [facility]    Assessment & Plan     1. Hypertension, unspecified type  Assessment & Plan:  - Not well controlled.  - Will start patient on lisinopril 5 mg daily. Discussed side effects.  - Continue to monitor blood pressure at home.   - Recommend follow up in 1 month.     Orders:  -     lisinopril (ZESTRIL) 5 mg tablet; Take 1 tablet (5 mg total) by mouth daily         Subjective     Patient presents to office today for follow up of elevated blood pressure. She has been monitoring her blood pressure at home and reports readings of 120-150s systolic and 70-90s diastolic. She brought her cuff into the office today which matches manual blood pressure reading. States that sometimes she experiences headaches and blurry vision when her blood pressure is elevated. She denies any other concerns or complaints today.           Review of Systems   Constitutional:  Negative for fatigue and fever.   HENT:  Negative for trouble swallowing.    Eyes:  Negative for visual disturbance.   Respiratory:  Negative for cough and shortness of breath.    Cardiovascular:  Negative for chest pain and palpitations.   Gastrointestinal:  Negative for abdominal pain and blood in stool.   Endocrine: Negative for cold intolerance and heat intolerance.   Genitourinary:  Negative for difficulty urinating and dysuria.   Musculoskeletal:  Negative for gait problem.   Skin:  Negative for rash.   Neurological:  Negative for dizziness, syncope and headaches.   Hematological:  Negative for adenopathy.   Psychiatric/Behavioral:  Negative for behavioral problems.        Past Medical History:   Diagnosis Date   • Asthma     childhood asthma -resolved at this time   • CPAP (continuous positive airway pressure) dependence    • Sleep apnea      Past Surgical History:   Procedure  Laterality Date   • CHOLECYSTECTOMY     • PLANTAR FASCIA SURGERY Right 9/13/2023    Procedure: RELEASE FASCIA PLANTAR/FASCIOTOMY OPEN;  Surgeon: Paxton Malloy DPM;  Location:  MAIN OR;  Service: Podiatry   • [place] GASTROCNEMIUS RECESSION Right 9/13/2023    Procedure: RECESSION GASTROCNEMIUS OPEN;  Surgeon: Paxton Malloy DPM;  Location:  MAIN OR;  Service: Podiatry     Family History   Problem Relation Age of Onset   • Anesthesia problems Neg Hx      Social History     Socioeconomic History   • Marital status: /Civil Union     Spouse name: None   • Number of children: None   • Years of education: None   • Highest education level: None   Occupational History   • None   Tobacco Use   • Smoking status: Never   • Smokeless tobacco: Never   • Tobacco comments:     Never a smoker or use of any tobacco products    Vaping Use   • Vaping status: Never Used   Substance and Sexual Activity   • Alcohol use: Yes     Comment: social -   • Drug use: Never     Comment: Denies any drug use per pt   • Sexual activity: Yes     Comment: Denies any chest pain or shortness of breath with activity   Other Topics Concern   • None   Social History Narrative   • None     Social Determinants of Health     Financial Resource Strain: Not on file   Food Insecurity: Not on file   Transportation Needs: Not on file   Physical Activity: Not on file   Stress: Not on file   Social Connections: Not on file   Intimate Partner Violence: Not on file   Housing Stability: Not on file     Current Outpatient Medications on File Prior to Visit   Medication Sig   • buPROPion (WELLBUTRIN XL) 150 mg 24 hr tablet Take 150 mg by mouth every morning   • phentermine 37.5 MG capsule Take 37.5 mg by mouth every morning 9/1/23 Pt instructed to hold 5 days prior to surgery   • Cholecalciferol (Vitamin D3) 1.25 MG (02902 UT) CAPS Take 1 capsule by mouth once a week (Patient not taking: Reported on 3/20/2024)   • Lecithin 1200 MG CAPS Take by mouth (Patient not  "taking: Reported on 3/20/2024)   • Multiple Vitamins-Minerals (MULTIVITAMIN GUMMIES ADULT PO) Take by mouth daily (Patient not taking: Reported on 2/21/2024)     Allergies   Allergen Reactions   • Dipyrone Other (See Comments)     Facial swelling. NB: this is a NSAID that causes agranulcytosis and is not used anymore.     Immunization History   Administered Date(s) Administered   • COVID-19 J&J (Eboni) vaccine 0.5 mL 04/06/2021   • COVID-19 PFIZER VACCINE 0.3 ML IM 12/30/2021   • HPV9 07/07/2023   • Hep B, adult 05/06/2023, 06/27/2023   • INFLUENZA 11/12/2012, 10/15/2013, 11/13/2014, 10/14/2015, 10/14/2015, 10/08/2022   • Influenza, injectable, quadrivalent, preservative free 0.5 mL 09/01/2020   • Tdap 06/20/2022       Objective     /80 (BP Location: Left arm, Patient Position: Sitting, Cuff Size: Large)   Pulse 91   Temp (!) 97.4 °F (36.3 °C) (Tympanic)   Resp 16   Ht 5' 5\" (1.651 m)   Wt 72.6 kg (160 lb)   SpO2 98%   BMI 26.63 kg/m²     Physical Exam  Vitals and nursing note reviewed.   Constitutional:       General: She is not in acute distress.     Appearance: Normal appearance. She is not ill-appearing.   HENT:      Head: Normocephalic and atraumatic.      Right Ear: External ear normal.      Left Ear: External ear normal.   Eyes:      Conjunctiva/sclera: Conjunctivae normal.   Cardiovascular:      Rate and Rhythm: Normal rate and regular rhythm.      Heart sounds: Normal heart sounds.   Pulmonary:      Effort: Pulmonary effort is normal.      Breath sounds: Normal breath sounds.   Musculoskeletal:         General: Normal range of motion.      Cervical back: Normal range of motion.   Skin:     General: Skin is warm and dry.   Neurological:      Mental Status: She is alert and oriented to person, place, and time.   Psychiatric:         Mood and Affect: Mood normal.         Behavior: Behavior normal.       ANJU Moscoso    "

## 2024-04-24 ENCOUNTER — OFFICE VISIT (OUTPATIENT)
Dept: FAMILY MEDICINE CLINIC | Facility: CLINIC | Age: 44
End: 2024-04-24
Payer: COMMERCIAL

## 2024-04-24 VITALS
TEMPERATURE: 97.4 F | HEART RATE: 86 BPM | BODY MASS INDEX: 26.33 KG/M2 | SYSTOLIC BLOOD PRESSURE: 136 MMHG | WEIGHT: 158 LBS | RESPIRATION RATE: 16 BRPM | OXYGEN SATURATION: 99 % | DIASTOLIC BLOOD PRESSURE: 84 MMHG | HEIGHT: 65 IN

## 2024-04-24 DIAGNOSIS — I10 HYPERTENSION, UNSPECIFIED TYPE: Primary | ICD-10-CM

## 2024-04-24 DIAGNOSIS — Z12.4 CERVICAL CANCER SCREENING: ICD-10-CM

## 2024-04-24 DIAGNOSIS — Z12.11 COLON CANCER SCREENING: ICD-10-CM

## 2024-04-24 DIAGNOSIS — E78.5 DYSLIPIDEMIA: ICD-10-CM

## 2024-04-24 DIAGNOSIS — L98.9 BENIGN SKIN LESION: ICD-10-CM

## 2024-04-24 DIAGNOSIS — Z12.31 ENCOUNTER FOR SCREENING MAMMOGRAM FOR MALIGNANT NEOPLASM OF BREAST: ICD-10-CM

## 2024-04-24 PROCEDURE — 99214 OFFICE O/P EST MOD 30 MIN: CPT | Performed by: NURSE PRACTITIONER

## 2024-04-24 RX ORDER — AMLODIPINE BESYLATE 2.5 MG/1
2.5 TABLET ORAL DAILY
Qty: 90 TABLET | Refills: 1 | Status: SHIPPED | OUTPATIENT
Start: 2024-04-24

## 2024-04-24 NOTE — ASSESSMENT & PLAN NOTE
Component      Latest Ref Rng 2/28/2024   Cholesterol      See Comment mg/dL 194    Triglycerides      See Comment mg/dL 125    HDL      >=50 mg/dL 46 (L)    LDL Calculated      0 - 100 mg/dL 123 (H)      - Encourage healthy diet and regular exercise.  - Will continue to monitor fasting lipid panel.

## 2024-04-24 NOTE — PROGRESS NOTES
Name: Nancy Marr      : 1980      MRN: [id number]  Encounter Provider: ANJU Moscoso  Encounter Date: 2024   Encounter department: [facility]    Assessment & Plan     1. Hypertension, unspecified type  Assessment & Plan:  - Not well controlled.  - She was started on lisinopril 5 mg daily but reports she had dry cough.   - Will switch to amlodipine 2.5 mg daily. Discussed side effects.  - Continue to monitor blood pressure at home.   - Recommend follow up in 1 month.     Orders:  -     amLODIPine (NORVASC) 2.5 mg tablet; Take 1 tablet (2.5 mg total) by mouth daily    2. Dyslipidemia  Assessment & Plan:  Component      Latest Ref Rng 2024   Cholesterol      See Comment mg/dL 194    Triglycerides      See Comment mg/dL 125    HDL      >=50 mg/dL 46 (L)    LDL Calculated      0 - 100 mg/dL 123 (H)      - Encourage healthy diet and regular exercise.  - Will continue to monitor fasting lipid panel.      3. Benign skin lesion  Assessment & Plan:  - Referred to Dermatology.     Orders:  -     Ambulatory Referral to Dermatology; Future    4. Colon cancer screening  -     Ambulatory Referral to General Surgery; Future    5. Encounter for screening mammogram for malignant neoplasm of breast  -     Mammo screening bilateral w 3d & cad; Future    6. Cervical cancer screening  -     Ambulatory Referral to Obstetrics / Gynecology; Future         Subjective     Patient presents to office today for 1 month follow up for hypertension. She was started on lisinopril 5 mg daily. Reports that she stopped taking is due to dizziness and dry cough. Blood pressures without the medication have been 120-150s systolic and 70-90s diastolic. Also concerned with a lesion on her left eyelid that seems to be getting bigger. Is not bothersome to her but she would like it removed. Requesting referral. Also reports possible menopause symptoms. She does have a Gynecologist but would like to  switch to someone in the [facility]'s network. She denies any other concerns or complaints today.           Review of Systems   Constitutional:  Negative for fatigue and fever.   HENT:  Negative for trouble swallowing.    Eyes:  Negative for visual disturbance.   Respiratory:  Negative for cough and shortness of breath.    Cardiovascular:  Negative for chest pain and palpitations.   Gastrointestinal:  Negative for abdominal pain and blood in stool.   Endocrine: Negative for cold intolerance and heat intolerance.   Genitourinary:  Negative for difficulty urinating and dysuria.   Musculoskeletal:  Negative for gait problem.   Skin:  Negative for rash.   Neurological:  Negative for dizziness, syncope and headaches.   Hematological:  Negative for adenopathy.   Psychiatric/Behavioral:  Negative for behavioral problems.        Past Medical History:   Diagnosis Date   • Asthma     childhood asthma -resolved at this time   • CPAP (continuous positive airway pressure) dependence    • Sleep apnea      Past Surgical History:   Procedure Laterality Date   • CHOLECYSTECTOMY     • PLANTAR FASCIA SURGERY Right 9/13/2023    Procedure: RELEASE FASCIA PLANTAR/FASCIOTOMY OPEN;  Surgeon: Paxton Malloy DPM;  Location:  MAIN OR;  Service: Podiatry   • [place] GASTROCNEMIUS RECESSION Right 9/13/2023    Procedure: RECESSION GASTROCNEMIUS OPEN;  Surgeon: Paxton Malloy DPM;  Location:  MAIN OR;  Service: Podiatry     Family History   Problem Relation Age of Onset   • Anesthesia problems Neg Hx      Social History     Socioeconomic History   • Marital status: /Civil Union     Spouse name: None   • Number of children: None   • Years of education: None   • Highest education level: None   Occupational History   • None   Tobacco Use   • Smoking status: Never   • Smokeless tobacco: Never   • Tobacco comments:     Never a smoker or use of any tobacco products    Vaping Use   • Vaping status: Never Used   Substance and Sexual Activity   •  Alcohol use: Yes     Comment: social -   • Drug use: Never     Comment: Denies any drug use per pt   • Sexual activity: Yes     Comment: Denies any chest pain or shortness of breath with activity   Other Topics Concern   • None   Social History Narrative   • None     Social Determinants of Health     Financial Resource Strain: Not on file   Food Insecurity: Not on file   Transportation Needs: Not on file   Physical Activity: Not on file   Stress: Not on file   Social Connections: Not on file   Intimate Partner Violence: Not on file   Housing Stability: Not on file     Current Outpatient Medications on File Prior to Visit   Medication Sig   • buPROPion (WELLBUTRIN XL) 150 mg 24 hr tablet Take 150 mg by mouth every morning   • phentermine 37.5 MG capsule Take 37.5 mg by mouth every morning 9/1/23 Pt instructed to hold 5 days prior to surgery   • Cholecalciferol (Vitamin D3) 1.25 MG (66246 UT) CAPS Take 1 capsule by mouth once a week (Patient not taking: Reported on 3/20/2024)   • Lecithin 1200 MG CAPS Take by mouth (Patient not taking: Reported on 3/20/2024)   • Multiple Vitamins-Minerals (MULTIVITAMIN GUMMIES ADULT PO) Take by mouth daily (Patient not taking: Reported on 2/21/2024)   • [DISCONTINUED] lisinopril (ZESTRIL) 5 mg tablet Take 1 tablet (5 mg total) by mouth daily     Allergies   Allergen Reactions   • Dipyrone Other (See Comments)     Facial swelling. NB: this is a NSAID that causes agranulcytosis and is not used anymore.   • Lisinopril Cough     Immunization History   Administered Date(s) Administered   • COVID-19 J&J (Eboni) vaccine 0.5 mL 04/06/2021   • COVID-19 PFIZER VACCINE 0.3 ML IM 12/30/2021   • HPV9 07/07/2023   • Hep B, adult 05/06/2023, 06/27/2023   • INFLUENZA 11/12/2012, 10/15/2013, 11/13/2014, 10/14/2015, 10/14/2015, 10/08/2022   • Influenza, injectable, quadrivalent, preservative free 0.5 mL 09/01/2020   • Tdap 06/20/2022       Objective     /84 (BP Location: Left arm, Patient  "Position: Sitting, Cuff Size: Adult)   Pulse 86   Temp (!) 97.4 °F (36.3 °C) (Tympanic)   Resp 16   Ht 5' 5\" (1.651 m)   Wt 71.7 kg (158 lb)   SpO2 99%   BMI 26.29 kg/m²     Physical Exam  Vitals and nursing note reviewed.   Constitutional:       General: She is not in acute distress.     Appearance: Normal appearance. She is not ill-appearing.   HENT:      Head: Normocephalic and atraumatic.      Right Ear: External ear normal.      Left Ear: External ear normal.   Eyes:      Conjunctiva/sclera: Conjunctivae normal.   Cardiovascular:      Rate and Rhythm: Normal rate and regular rhythm.      Heart sounds: Normal heart sounds.   Pulmonary:      Effort: Pulmonary effort is normal.      Breath sounds: Normal breath sounds.   Musculoskeletal:         General: Normal range of motion.      Cervical back: Normal range of motion.   Skin:     General: Skin is warm and dry.   Neurological:      Mental Status: She is alert and oriented to person, place, and time.   Psychiatric:         Mood and Affect: Mood normal.         Behavior: Behavior normal.       ANJU Moscoso    "

## 2024-04-24 NOTE — ASSESSMENT & PLAN NOTE
- Not well controlled.  - She was started on lisinopril 5 mg daily but reports she had dry cough.   - Will switch to amlodipine 2.5 mg daily. Discussed side effects.  - Continue to monitor blood pressure at home.   - Recommend follow up in 1 month.

## 2024-05-09 ENCOUNTER — TELEPHONE (OUTPATIENT)
Age: 44
End: 2024-05-09

## 2024-05-09 NOTE — TELEPHONE ENCOUNTER
Scheduled date of colonoscopy (as of today):  06.24.2024    Physician performing colonoscopy:  Dr. Amaral    Location of colonoscopy:  [facility]    Bowel prep reviewed with patient:  Miralax/Ducolax

## 2024-05-09 NOTE — TELEPHONE ENCOUNTER
COLONOSCOPY  MIRALAX/Dulcolax Bowel Preparation Instructions    The OR/GI Lab will contact you the evening prior to your procedure with your exact arrival time.    Our practice requires a 1 week notice for any cancellations or rescheduling. We kindly ask that you immediately notify us of any changes including any new medications that are prescribed. Thank you for your cooperation.     WEEK BEFORE YOUR PROCEDURE:  Stop taking Iron tablets.  5 days prior, AVOID vegetables and fruits with skins or seeds, nuts, corn, popcorn and whole grain breads.   Purchase: One (1) 238-gram container of Miralax (polyethylene glycol 3350), four (4) 5 mg Dulcolax (bisacodyl) tablets, and one (1) 64-ounce bottle of Gatorade (sports drink) - no red, orange, or purple. These may be purchased at any pharmacy without a prescription. Generic products are permissible.   Arrange responsible transportation for day of the procedure.     DAY BEFORE THE PROCEDURE:   CLEAR liquids only for entire day prior. Nothing red, orange or purple.    You MAY have:                                                               Soda  Water  Broth Gatorade  Jello  Popsicles Coffee/tea without milk/creamer     YOU MAY NOT HAVE:  Solid foods   Milk and milk products    Juice with pulp    BOWEL PREPARATION:  Includes: One (1) 238-gram container of Miralax (polyethylene glycol 3350), four (4) 5 mg Dulcolax (bisacodyl) tablets, and one (1) 64-ounce bottle of Gatorade (sports drink).  Preparation may be refrigerated.  Entire bowel prep should be completed.     Afternoon before the procedure (2:00 pm - 5:00 pm):    Take two (2) 5 mg Dulcolax laxative tablets.     Evening before the procedure (6:00 pm):  Mix entire container of Miralax with one (1) 64-ounce bottle of Gatorade and shake until all medication is dissolved.   Begin drinking solution. Drink an eight (8) ounce cup every 10-15 minutes until you have consumed half (32 ounces) of the solution.  Refrigerate  remaining solution.    Night before the procedure (8:00 pm):  Take two (2) 5 mg Dulcolax laxative tablets.     Beginning 5 hours before your procedure:  Drink the remaining amount of prepared solution (32 ounces).  Drink an eight (8) ounce cup every 10-15 minutes until you have consumed the remaining solution.     Bowel prep should be completed 4 hours prior to procedure time.    NOTHING TO EAT OR DRINK AFTER MIDNIGHT- EXCEPT FOR YOUR PREP    DAY OF THE PROCEDURE:  You may brush your teeth.  Leave all jewelry at home.  Please arrive for your procedure as indicated by the OR / GI Lab / Endoscopy Unit. The hospital will contact you the day before with your exact arrival time.   Make sure you have arranged ahead of time for a responsible adult (18 or older) to accompany and drive you home after the procedure.  Please discuss any transportation concerns with our staff prior to your procedure.    The effects of the anesthesia can persist for 24 hours.  After receiving the sedation, you must exercise caution before engaging in any activity that could harm yourself and others (such as driving a car).  Do not make any important decisions or do not drink any alcoholic beverages during this time period.  After your procedure, you may have anything you'd like to eat or drink.  You will probably want to start with something light.  Please include plenty of fluids.  Avoid items that cause gas such as sodas and salads.    SPECIAL INSTRUCTIONS:    For patients currently taking blood thinners and/or antiplatelet therapy our office will contact the prescribing provider.  Our office will contact you with any required changes to your medication regimen.     Blood thinner (i.e. - Coumadin, Pradaxa, Lovenox, Xarelto, Eliquis)  ?  Continue (Do Not Stop)  ? Stop______________for_____________days prior to the procedure.    Antiplatelet (i.e. - Plavix, Aggrenox, Effient, Brilinta)  ?  Continue (Do Not  Stop)  ? Stop______________for_____________days prior to the procedure.       Diabetes:   If you are Diabetic, please see separate Diabetic Instruction Sheet.          Prescribed medications:  Do not stop your aspirin, or any of your other medications (unless instructed otherwise).    Take the rest of your prescribed medications with small sips of water at least 2 hours prior to your procedure.      For any questions or concerns related to your bowel preparation or pre-procedure instructions, please contact our office at [phone].  Thank you for choosing [facility]!

## 2024-05-22 ENCOUNTER — OFFICE VISIT (OUTPATIENT)
Dept: FAMILY MEDICINE CLINIC | Facility: CLINIC | Age: 44
End: 2024-05-22
Payer: COMMERCIAL

## 2024-05-22 VITALS
RESPIRATION RATE: 16 BRPM | DIASTOLIC BLOOD PRESSURE: 88 MMHG | OXYGEN SATURATION: 99 % | SYSTOLIC BLOOD PRESSURE: 130 MMHG | WEIGHT: 163.4 LBS | TEMPERATURE: 98.4 F | HEART RATE: 97 BPM | HEIGHT: 65 IN | BODY MASS INDEX: 27.22 KG/M2

## 2024-05-22 DIAGNOSIS — E78.5 DYSLIPIDEMIA: ICD-10-CM

## 2024-05-22 DIAGNOSIS — I10 HYPERTENSION, UNSPECIFIED TYPE: Primary | ICD-10-CM

## 2024-05-22 PROCEDURE — 99214 OFFICE O/P EST MOD 30 MIN: CPT | Performed by: NURSE PRACTITIONER

## 2024-05-22 RX ORDER — AMLODIPINE BESYLATE 5 MG/1
5 TABLET ORAL DAILY
Qty: 90 TABLET | Refills: 1 | Status: SHIPPED | OUTPATIENT
Start: 2024-05-22

## 2024-05-22 NOTE — ASSESSMENT & PLAN NOTE
- Not well controlled but improving.   - Will increase Norvasc to 5 mg daily. Discussed side effects.  - Continue to monitor blood pressure at home.   - Will continue to monitor.

## 2024-05-22 NOTE — PROGRESS NOTES
Ambulatory Visit  Name: Nancy Marr      : 1980      MRN: [id number]  Encounter Provider: ANJU Moscoso  Encounter Date: 2024   Encounter department: [facility] [address] [facility]    Assessment & Plan   1. Hypertension, unspecified type  Assessment & Plan:  - Not well controlled but improving.   - Will increase Norvasc to 5 mg daily. Discussed side effects.  - Continue to monitor blood pressure at home.   - Will continue to monitor.   Orders:  -     amLODIPine (NORVASC) 5 mg tablet; Take 1 tablet (5 mg total) by mouth daily  2. Dyslipidemia  Assessment & Plan:  Component      Latest Ref Rng 2024   Cholesterol      See Comment mg/dL 194    Triglycerides      See Comment mg/dL 125    HDL      >=50 mg/dL 46 (L)    LDL Calculated      0 - 100 mg/dL 123 (H)      - Encourage healthy diet and regular exercise.  - Will continue to monitor fasting lipid panel.       History of Present Illness   {Disappearing Hyperlinks I Encounters * My Last Note * Since Last Visit * History :57457}  Patient presents to office today for 1 month follow up regarding hypertension. She was switched from lisinopril to amlodipine due to cough. Her blood pressure is somewhat improved but still not well controlled. She has been monitoring her blood pressure at home and reports readings of 130-140s systolic and 80s diastolic.         Review of Systems   Constitutional:  Negative for fatigue and fever.   HENT:  Negative for trouble swallowing.    Eyes:  Negative for visual disturbance.   Respiratory:  Negative for cough and shortness of breath.    Cardiovascular:  Negative for chest pain and palpitations.   Gastrointestinal:  Negative for abdominal pain and blood in stool.   Endocrine: Negative for cold intolerance and heat intolerance.   Genitourinary:  Negative for difficulty urinating and dysuria.   Musculoskeletal:  Negative for gait problem.   Skin:  Negative for rash.   Neurological:  Negative for  dizziness, syncope and headaches.   Hematological:  Negative for adenopathy.   Psychiatric/Behavioral:  Negative for behavioral problems.      Past Medical History:   Diagnosis Date   • Asthma     childhood asthma -resolved at this time   • CPAP (continuous positive airway pressure) dependence    • Sleep apnea      Past Surgical History:   Procedure Laterality Date   • CHOLECYSTECTOMY     • PLANTAR FASCIA SURGERY Right 9/13/2023    Procedure: RELEASE FASCIA PLANTAR/FASCIOTOMY OPEN;  Surgeon: Paxton Malloy DPM;  Location:  MAIN OR;  Service: Podiatry   • MA GASTROCNEMIUS RECESSION Right 9/13/2023    Procedure: RECESSION GASTROCNEMIUS OPEN;  Surgeon: Paxton Malloy DPM;  Location:  MAIN OR;  Service: Podiatry     Family History   Problem Relation Age of Onset   • Anesthesia problems Neg Hx      Social History     Tobacco Use   • Smoking status: Never   • Smokeless tobacco: Never   • Tobacco comments:     Never a smoker or use of any tobacco products    Vaping Use   • Vaping status: Never Used   Substance and Sexual Activity   • Alcohol use: Yes     Comment: social -   • Drug use: Never     Comment: Denies any drug use per pt   • Sexual activity: Yes     Comment: Denies any chest pain or shortness of breath with activity     Current Outpatient Medications on File Prior to Visit   Medication Sig   • [DISCONTINUED] amLODIPine (NORVASC) 2.5 mg tablet Take 1 tablet (2.5 mg total) by mouth daily   • buPROPion (WELLBUTRIN XL) 150 mg 24 hr tablet Take 150 mg by mouth every morning (Patient not taking: Reported on 5/22/2024)   • Cholecalciferol (Vitamin D3) 1.25 MG (24405 UT) CAPS Take 1 capsule by mouth once a week (Patient not taking: Reported on 3/20/2024)   • Lecithin 1200 MG CAPS Take by mouth (Patient not taking: Reported on 3/20/2024)   • Multiple Vitamins-Minerals (MULTIVITAMIN GUMMIES ADULT PO) Take by mouth daily (Patient not taking: Reported on 2/21/2024)   • phentermine 37.5 MG capsule Take 37.5 mg by mouth  "every morning 9/1/23 Pt instructed to hold 5 days prior to surgery (Patient not taking: Reported on 5/22/2024)     Allergies   Allergen Reactions   • Dipyrone Other (See Comments)     Facial swelling. NB: this is a NSAID that causes agranulcytosis and is not used anymore.   • Lisinopril Cough     Immunization History   Administered Date(s) Administered   • COVID-19 J&J (Eboni) vaccine 0.5 mL 04/06/2021   • COVID-19 PFIZER VACCINE 0.3 ML IM 12/30/2021   • HPV9 07/07/2023   • Hep B, adult 05/06/2023, 06/27/2023   • INFLUENZA 11/12/2012, 10/15/2013, 11/13/2014, 10/14/2015, 10/14/2015, 10/08/2022   • Influenza, injectable, quadrivalent, preservative free 0.5 mL 09/01/2020   • Tdap 06/20/2022     Objective   {Disappearing Hyperlinks   Review Vitals * Enter New Vitals * Results Review * Labs * Imaging * Cardiology * Procedures * Lung Cancer Screening :49422}  /88 (BP Location: Left arm, Patient Position: Sitting, Cuff Size: Standard)   Pulse 97   Temp 98.4 °F (36.9 °C) (Tympanic)   Resp 16   Ht 5' 5\" (1.651 m)   Wt 74.1 kg (163 lb 6.4 oz)   SpO2 99%   BMI 27.19 kg/m²     Physical Exam  Vitals and nursing note reviewed.   Constitutional:       Appearance: Normal appearance. She is well-developed.   HENT:      Head: Normocephalic and atraumatic.      Right Ear: External ear normal.      Left Ear: External ear normal.   Eyes:      Conjunctiva/sclera: Conjunctivae normal.   Cardiovascular:      Rate and Rhythm: Normal rate and regular rhythm.      Heart sounds: Normal heart sounds.   Pulmonary:      Effort: Pulmonary effort is normal.      Breath sounds: Normal breath sounds.   Musculoskeletal:         General: Normal range of motion.      Cervical back: Normal range of motion.   Skin:     General: Skin is warm and dry.   Neurological:      Mental Status: She is alert and oriented to person, place, and time.   Psychiatric:         Mood and Affect: Mood normal.         Behavior: Behavior normal. "       Administrative Statements {Disappearing Hyperlinks I  Level of Service * PCM/PCSP:62883}

## 2024-05-24 PROBLEM — Z12.11 COLON CANCER SCREENING: Status: RESOLVED | Noted: 2024-04-24 | Resolved: 2024-05-24

## 2024-05-24 PROBLEM — Z12.4 CERVICAL CANCER SCREENING: Status: RESOLVED | Noted: 2024-04-24 | Resolved: 2024-05-24

## 2024-06-07 ENCOUNTER — ANESTHESIA (OUTPATIENT)
Dept: ANESTHESIOLOGY | Facility: HOSPITAL | Age: 44
End: 2024-06-07

## 2024-06-07 ENCOUNTER — ANESTHESIA EVENT (OUTPATIENT)
Dept: ANESTHESIOLOGY | Facility: HOSPITAL | Age: 44
End: 2024-06-07

## 2024-06-12 ENCOUNTER — TELEPHONE (OUTPATIENT)
Dept: GASTROENTEROLOGY | Facility: MEDICAL CENTER | Age: 44
End: 2024-06-12

## 2024-06-12 NOTE — TELEPHONE ENCOUNTER
Confirming Upcoming Procedure: colonoscopy on 06/24/2024  Physician performing: Dr. Amaral  Location of procedure:  [place]  Prep: Miralax    Confirmed

## 2024-06-19 RX ORDER — SODIUM CHLORIDE 9 MG/ML
125 INJECTION, SOLUTION INTRAVENOUS CONTINUOUS
Status: CANCELLED | OUTPATIENT
Start: 2024-06-19

## 2024-06-23 ENCOUNTER — NURSE TRIAGE (OUTPATIENT)
Dept: OTHER | Facility: OTHER | Age: 44
End: 2024-06-23

## 2024-06-23 NOTE — TELEPHONE ENCOUNTER
"Colonoscopy scheduled for 1:30pm. Arrive by 12:30 pm.     Reason for Disposition   Question about upcoming scheduled test, no triage required and triager able to answer question    Answer Assessment - Initial Assessment Questions  1. REASON FOR CALL or QUESTION: \"What is your reason for calling today?\" or \"How can I best help you?\" or \"What question do you have that I can help answer?\"      What time do I arrive for my colonoscopy tomorrow?    Protocols used: Information Only Call - No Triage-ADULT-    "

## 2024-06-24 ENCOUNTER — ANESTHESIA (OUTPATIENT)
Dept: GASTROENTEROLOGY | Facility: MEDICAL CENTER | Age: 44
End: 2024-06-24

## 2024-06-24 ENCOUNTER — ANESTHESIA EVENT (OUTPATIENT)
Dept: GASTROENTEROLOGY | Facility: MEDICAL CENTER | Age: 44
End: 2024-06-24

## 2024-06-24 ENCOUNTER — HOSPITAL ENCOUNTER (OUTPATIENT)
Dept: GASTROENTEROLOGY | Facility: MEDICAL CENTER | Age: 44
Setting detail: OUTPATIENT SURGERY
Discharge: HOME | End: 2024-06-24
Payer: COMMERCIAL

## 2024-06-24 VITALS
OXYGEN SATURATION: 100 % | TEMPERATURE: 97.6 F | SYSTOLIC BLOOD PRESSURE: 138 MMHG | WEIGHT: 157 LBS | DIASTOLIC BLOOD PRESSURE: 79 MMHG | HEIGHT: 65 IN | RESPIRATION RATE: 18 BRPM | BODY MASS INDEX: 26.16 KG/M2 | HEART RATE: 72 BPM

## 2024-06-24 DIAGNOSIS — Z12.11 SCREENING FOR COLON CANCER: ICD-10-CM

## 2024-06-24 LAB
EXT PREGNANCY TEST URINE: NEGATIVE
EXT. CONTROL: NORMAL

## 2024-06-24 PROCEDURE — 45385 COLONOSCOPY W/LESION REMOVAL: CPT | Performed by: STUDENT IN AN ORGANIZED HEALTH CARE EDUCATION/TRAINING PROGRAM

## 2024-06-24 PROCEDURE — 81025 URINE PREGNANCY TEST: CPT | Performed by: ANESTHESIOLOGY

## 2024-06-24 PROCEDURE — 88305 TISSUE EXAM BY PATHOLOGIST: CPT | Performed by: PATHOLOGY

## 2024-06-24 RX ORDER — PROPOFOL 10 MG/ML
INJECTION, EMULSION INTRAVENOUS AS NEEDED
Status: DISCONTINUED | OUTPATIENT
Start: 2024-06-24 | End: 2024-06-24

## 2024-06-24 RX ORDER — SODIUM CHLORIDE 9 MG/ML
125 INJECTION, SOLUTION INTRAVENOUS CONTINUOUS
Status: DISCONTINUED | OUTPATIENT
Start: 2024-06-24 | End: 2024-06-28 | Stop reason: HOSPADM

## 2024-06-24 RX ADMIN — SODIUM CHLORIDE 125 ML/HR: 0.9 INJECTION, SOLUTION INTRAVENOUS at 13:40

## 2024-06-24 RX ADMIN — PROPOFOL 80 MG: 10 INJECTION, EMULSION INTRAVENOUS at 13:47

## 2024-06-24 RX ADMIN — PROPOFOL 120 MCG/KG/MIN: 10 INJECTION, EMULSION INTRAVENOUS at 13:48

## 2024-06-24 NOTE — ANESTHESIA POSTPROCEDURE EVALUATION
"Post-Op Assessment Note    CV Status:  Stable    Pain management: adequate       Mental Status:  Alert and awake   Hydration Status:  Euvolemic   PONV Controlled:  Controlled   Airway Patency:  Patent  Two or more mitigation strategies used for obstructive sleep apnea   Post Op Vitals Reviewed: Yes    No anethesia notable event occurred.    Staff: Anesthesiologist       /81   Pulse 67   Temp 97.6 °F (36.4 °C)   Resp 18   Ht 5' 5\" (1.651 m)   Wt 71.2 kg (157 lb)   LMP 06/17/2024 (Approximate)   SpO2 100%   BMI 26.13 kg/m²           BP      Temp      Pulse     Resp      SpO2        "

## 2024-06-24 NOTE — H&P
History and Physical - [facility]  Nancy Marr 44 y.o. female MRN: [id number]                  HPI: Nancy Marr is a 44 y.o. year old female who presents for colon cancer screening      REVIEW OF SYSTEMS: Per the HPI, and otherwise unremarkable.    Historical Information   Past Medical History:   Diagnosis Date    Asthma     childhood asthma -resolved at this time    CPAP (continuous positive airway pressure) dependence     Sleep apnea      Past Surgical History:   Procedure Laterality Date    CHOLECYSTECTOMY      PLANTAR FASCIA SURGERY Right 9/13/2023    Procedure: RELEASE FASCIA PLANTAR/FASCIOTOMY OPEN;  Surgeon: Paxton Malloy DPM;  Location:  MAIN OR;  Service: Podiatry    NH GASTROCNEMIUS RECESSION Right 9/13/2023    Procedure: RECESSION GASTROCNEMIUS OPEN;  Surgeon: Paxton Malloy DPM;  Location:  MAIN OR;  Service: Podiatry     Social History   Social History     Substance and Sexual Activity   Alcohol Use Yes    Comment: social -     Social History     Substance and Sexual Activity   Drug Use Never    Comment: Denies any drug use per pt     Social History     Tobacco Use   Smoking Status Never   Smokeless Tobacco Never   Tobacco Comments    Never a smoker or use of any tobacco products      Family History   Problem Relation Age of Onset    Anesthesia problems Neg Hx        Meds/Allergies       Current Outpatient Medications:     amLODIPine (NORVASC) 5 mg tablet    buPROPion (WELLBUTRIN XL) 150 mg 24 hr tablet    Cholecalciferol (Vitamin D3) 1.25 MG (59163 UT) CAPS    Lecithin 1200 MG CAPS    Multiple Vitamins-Minerals (MULTIVITAMIN GUMMIES ADULT PO)    phentermine 37.5 MG capsule    Current Facility-Administered Medications:     sodium chloride 0.9 % infusion, 125 mL/hr, Intravenous, Continuous    Allergies   Allergen Reactions    Dipyrone Other (See Comments)     Facial swelling. NB: this is a NSAID that causes agranulcytosis and is not used anymore.    Lisinopril Cough  "      Objective     Ht 5' 5\" (1.651 m)   Wt 71.2 kg (157 lb)   LMP 06/17/2024 (Approximate)   BMI 26.13 kg/m²       PHYSICAL EXAM    Gen: NAD  Head: NCAT  CV: RRR  CHEST: Clear  ABD: soft, NT/ND  EXT: no edema      ASSESSMENT/PLAN:  This is a 44 y.o. year old female here for colonoscopy, and she is stable and optimized for her procedure.        "

## 2024-06-24 NOTE — ANESTHESIA PREPROCEDURE EVALUATION
Procedure:  COLONOSCOPY    Relevant Problems   ANESTHESIA (within normal limits)      CARDIO   (+) Hypertension      PULMONARY   (+) Sleep apnea        Physical Exam    Airway    Mallampati score: II  TM Distance: >3 FB  Neck ROM: full     Dental   No notable dental hx     Cardiovascular  Rhythm: regular, Rate: normal, Cardiovascular exam normal    Pulmonary  Pulmonary exam normal Breath sounds clear to auscultation    Other Findings  post-pubertal.      Anesthesia Plan  ASA Score- 2     Anesthesia Type- IV sedation with anesthesia with ASA Monitors.         Additional Monitors:     Airway Plan:            Plan Factors-Exercise tolerance (METS): >4 METS.    Chart reviewed.   Existing labs reviewed. Patient summary reviewed.    Patient is not a current smoker.              Induction- intravenous.    Postoperative Plan-         Informed Consent- Anesthetic plan and risks discussed with patient.

## 2024-06-24 NOTE — PERIOPERATIVE NURSING NOTE
Spoke with Jersey again at 1600 who stated that he would not be her until approximately 1630 to  patient.  Still awaiting his arrival.

## 2024-06-24 NOTE — PERIOPERATIVE NURSING NOTE
Upon calling patient's significant other, Jersey, he informed me that he had an appointment and would be here to pick her up by 1530.

## 2024-06-25 ENCOUNTER — TELEPHONE (OUTPATIENT)
Age: 44
End: 2024-06-25

## 2024-06-25 NOTE — TELEPHONE ENCOUNTER
Pt called stated she had a procedure yesterday and left her one earing. [facility]

## 2024-06-25 NOTE — TELEPHONE ENCOUNTER
"Pt calling to schedule another colonoscopy. Pt just had one yesterday 6/24/24 w/ Dr. Lyman at [facility]. Per Dr. Lyman notes \"RECOMMENDATION:  Repeat colonoscopy in 6 months, due: 12/21/2024\"    Epic would not let me put in another order. I advised pt to call back in a week so that the current order can finish processing.  "

## 2024-06-26 PROCEDURE — 88305 TISSUE EXAM BY PATHOLOGIST: CPT | Performed by: PATHOLOGY

## 2024-07-10 ENCOUNTER — HOSPITAL ENCOUNTER (OUTPATIENT)
Dept: RADIOLOGY | Facility: IMAGING CENTER | Age: 44
Discharge: HOME | End: 2024-07-10
Payer: COMMERCIAL

## 2024-07-10 VITALS — BODY MASS INDEX: 26.66 KG/M2 | WEIGHT: 160 LBS | HEIGHT: 65 IN

## 2024-07-10 DIAGNOSIS — Z12.31 ENCOUNTER FOR SCREENING MAMMOGRAM FOR MALIGNANT NEOPLASM OF BREAST: ICD-10-CM

## 2024-07-10 PROCEDURE — 77067 SCR MAMMO BI INCL CAD: CPT

## 2024-07-10 PROCEDURE — 77063 BREAST TOMOSYNTHESIS BI: CPT

## 2024-07-17 ENCOUNTER — OFFICE VISIT (OUTPATIENT)
Dept: OBGYN CLINIC | Facility: CLINIC | Age: 44
End: 2024-07-17
Payer: COMMERCIAL

## 2024-07-17 VITALS
HEIGHT: 65 IN | BODY MASS INDEX: 27.66 KG/M2 | DIASTOLIC BLOOD PRESSURE: 78 MMHG | WEIGHT: 166 LBS | SYSTOLIC BLOOD PRESSURE: 120 MMHG

## 2024-07-17 DIAGNOSIS — R87.810 CERVICAL HIGH RISK HPV (HUMAN PAPILLOMAVIRUS) TEST POSITIVE: ICD-10-CM

## 2024-07-17 DIAGNOSIS — Z12.4 CERVICAL CANCER SCREENING: ICD-10-CM

## 2024-07-17 DIAGNOSIS — Z01.419 ENCOUNTER FOR ANNUAL ROUTINE GYNECOLOGICAL EXAMINATION: Primary | ICD-10-CM

## 2024-07-17 DIAGNOSIS — Z12.31 VISIT FOR SCREENING MAMMOGRAM: ICD-10-CM

## 2024-07-17 DIAGNOSIS — Z12.4 PAP SMEAR FOR CERVICAL CANCER SCREENING: ICD-10-CM

## 2024-07-17 DIAGNOSIS — Z72.3 INADEQUATE EXERCISE: ICD-10-CM

## 2024-07-17 DIAGNOSIS — E58 DIETARY CALCIUM DEFICIENCY: ICD-10-CM

## 2024-07-17 PROBLEM — Z98.890 POST-OPERATIVE STATE: Status: RESOLVED | Noted: 2023-09-13 | Resolved: 2024-07-17

## 2024-07-17 PROBLEM — G56.03 BILATERAL CARPAL TUNNEL SYNDROME: Status: ACTIVE | Noted: 2022-11-04

## 2024-07-17 PROCEDURE — G0145 SCR C/V CYTO,THINLAYER,RESCR: HCPCS | Performed by: OBSTETRICS & GYNECOLOGY

## 2024-07-17 PROCEDURE — G0476 HPV COMBO ASSAY CA SCREEN: HCPCS | Performed by: OBSTETRICS & GYNECOLOGY

## 2024-07-17 PROCEDURE — S0610 ANNUAL GYNECOLOGICAL EXAMINA: HCPCS | Performed by: OBSTETRICS & GYNECOLOGY

## 2024-07-18 LAB
HPV HR 12 DNA CVX QL NAA+PROBE: NEGATIVE
HPV16 DNA CVX QL NAA+PROBE: NEGATIVE
HPV18 DNA CVX QL NAA+PROBE: NEGATIVE

## 2024-07-24 LAB
LAB AP GYN PRIMARY INTERPRETATION: NORMAL
Lab: NORMAL

## 2024-08-16 PROBLEM — Z01.419 ENCOUNTER FOR ANNUAL ROUTINE GYNECOLOGICAL EXAMINATION: Status: RESOLVED | Noted: 2024-07-17 | Resolved: 2024-08-16

## 2024-09-11 ENCOUNTER — OFFICE VISIT (OUTPATIENT)
Dept: FAMILY MEDICINE CLINIC | Facility: CLINIC | Age: 44
End: 2024-09-11
Payer: COMMERCIAL

## 2024-09-11 VITALS
DIASTOLIC BLOOD PRESSURE: 84 MMHG | HEART RATE: 82 BPM | RESPIRATION RATE: 16 BRPM | HEIGHT: 65 IN | SYSTOLIC BLOOD PRESSURE: 130 MMHG | BODY MASS INDEX: 28.49 KG/M2 | TEMPERATURE: 98.1 F | WEIGHT: 171 LBS | OXYGEN SATURATION: 99 %

## 2024-09-11 DIAGNOSIS — G47.9 SLEEP DISTURBANCE: ICD-10-CM

## 2024-09-11 DIAGNOSIS — I10 HYPERTENSION, UNSPECIFIED TYPE: ICD-10-CM

## 2024-09-11 DIAGNOSIS — R63.5 WEIGHT GAIN: ICD-10-CM

## 2024-09-11 DIAGNOSIS — M77.01 MEDIAL EPICONDYLITIS OF RIGHT ELBOW: Primary | ICD-10-CM

## 2024-09-11 PROCEDURE — 99214 OFFICE O/P EST MOD 30 MIN: CPT | Performed by: NURSE PRACTITIONER

## 2024-09-11 RX ORDER — ARM BRACE
EACH MISCELLANEOUS DAILY
Qty: 2 EACH | Refills: 0 | Status: SHIPPED | OUTPATIENT
Start: 2024-09-11

## 2024-09-11 RX ORDER — HYDROXYZINE PAMOATE 25 MG/1
25 CAPSULE ORAL
Qty: 90 CAPSULE | Refills: 0 | Status: SHIPPED | OUTPATIENT
Start: 2024-09-11

## 2024-09-11 RX ORDER — DICLOFENAC SODIUM 75 MG/1
75 TABLET, DELAYED RELEASE ORAL 2 TIMES DAILY
Qty: 30 TABLET | Refills: 0 | Status: SHIPPED | OUTPATIENT
Start: 2024-09-11 | End: 2024-09-25

## 2024-09-11 NOTE — PATIENT INSTRUCTIONS
Patient Education     Lateral Epicondylitis Exercises   About this topic   The elbow is where your upper arm bone meets the two lower bones in your arm. There is a bump on the outside of your elbow at the bottom of your upper arm bone. It is the lateral epicondyle. A few tendons attach here. Tendons attach muscles to bone. These muscles are used to pull your wrist and fingers up.   When these tendons get sore and swollen from overuse, you have lateral epicondylitis. Is also called tennis elbow. This is a common problem in tennis players. It may also happen with other activities or sports. You are more likely to have this problem in the arm you use most, but it can happen in either arm. Exercises can help to make this problem better.  General   Before starting with a program, ask your doctor if you are healthy enough to do these exercises. Your doctor may have you work with a  or physical therapist to make a safe exercise program to meet your needs.  Stretching Exercises   Stretching exercises keep your muscles flexible. They also stop them from getting tight. Start by doing each of these stretches 2 to 3 times. In order for your body to make changes, you will need to hold these stretches for 20 to 30 seconds. Try to do the stretches 2 to 3 times each day. Do all exercises slowly.  Wrist stretches bending down ? Straighten your elbow and have your palm facing down. Keeping your sore elbow straight, bend your hand and fingers down so that your fingers are now pointing to the floor. Grab your hand with your other hand and push back the wrist further until you feel a stretch.  Strengthening Exercises   Strengthening exercises keep your muscles firm and strong. Start by repeating each exercise 2 to 3 times. Work up to doing each exercise 10 times. Try to do the exercises 2 to 3 times each day. Do all exercises slowly.  Some exercises can be done holding a small weight. You can use a can of soup or a hand weight.  If the exercise gets too easy, use heavier weights or do the exercise more times.  Wrist exercises seated with your lower arm supported on a table or your leg. Your hand should be off the edge:  Bending up ? Have your palm facing UP with a small weight in your hand. Bend your wrist up as far as you can. Now, lower the weight back down. Be sure to control the weight as you lower it. Repeat using other hand.  Bending down ? Have your palm facing DOWN with a small weight in your hand. Bend your wrist back as far as you can. Now, lower the weight back down. Be sure to control the weight as you lower it. Repeat using other hand.  Side-to-side ? Position your hand SIDEWAYS with your thumb up. With a weight in your hand, lift your hand straight up. Now, lower your hand back down. Repeat using other hand.  Lower arm twists with weight ? Start by having your elbow bent with your arm at your side. Hold a small weight in your hand. Keeping your arm at your side and not moving your elbow, turn the lower arm until your palm is facing down. Now, turn the lower arm until your palm is facing up. Repeat using other hand.  Finger spreads with rubber band ? Find a thick rubber band. Straighten your fingers and bring them together so they are touching each other. Place the rubber band around your fingers and thumb as close to the nails as possible. Spread your fingers out as far as you can. Then, slowly bring them back to the starting position.  Ball squeezes ? Gently squeeze a tennis ball 10 times. If you have no pain, squeeze with more pressure.  Tennis strokes with exercise band ? Once your pain has lessened and you are almost ready to return to the tennis court, try these 3 exercises. You will need to exercise near a door or closet that can be opened and closed easily. Start with a thinner band and work your way up to a thicker band. These band exercises can help you with three tennis strokes:  Kevyn ? Secure an exercise band in  a door at waist level. Stand sideways with the hand you use the closest to the door. Grab the band with that hand. Pull the band across your body like you do in a karen motion.  Backhand ? Secure an exercise band in a door at waist level. Stand sideways with the hand you use the most away from the door. Reach toward the door and grab the band with this hand. Now, pull the band across your body like you do in a backhand motion.  Serve ? Secure an exercise band in a door at shoulder level. Stand facing away from the door. Grab the band with the hand you use the most. Start with your hand up and behind your head like you would for the start of a serve. Pull the band up and over like you are doing a serving motion.  Your doctor or therapist may also have you use a rubber bar or Flex bar for exercises to help your lateral epicondylitis.               What will the results be?   Less pain and swelling  Increased strength  Better range of motion  Greater ease doing arm activities  Helpful tips   Stay active and work out to keep your muscles strong and flexible.  Keep a healthy weight to avoid putting too much stress on your joints. Eat a healthy diet to keep your muscles healthy.  Be sure you do not hold your breath when exercising. This can raise your blood pressure. If you tend to hold your breath, try counting out loud when exercising. If any exercise bothers you, stop right away.  Always warm up before stretching. Heated muscles stretch much easier than cool muscles. Stretching cool muscles can lead to injury.  Try walking and swinging your arms at an easy pace for a few minutes to warm up your muscles. Do this again after exercising.  Never bounce when doing stretches.  Doing exercises before a meal may be a good way to get into a routine.  If you are using weights, choose a weight that will allow you to repeat the exercise 10 times before resting. If you easily do 10 repeats, you may not be using enough weight. If  "you are not able to do 10 repeats, you are using too heavy of a weight.  Exercise may be slightly uncomfortable, but you should not have sharp pains. If you do get sharp pains, stop what you are doing. If the sharp pains continue, call your doctor.  Last Reviewed Date   2021-08-03  Consumer Information Use and Disclaimer   This generalized information is a limited summary of diagnosis, treatment, and/or medication information. It is not meant to be comprehensive and should be used as a tool to help the user understand and/or assess potential diagnostic and treatment options. It does NOT include all information about conditions, treatments, medications, side effects, or risks that may apply to a specific patient. It is not intended to be medical advice or a substitute for the medical advice, diagnosis, or treatment of a health care provider based on the health care provider's examination and assessment of a patient’s specific and unique circumstances. Patients must speak with a health care provider for complete information about their health, medical questions, and treatment options, including any risks or benefits regarding use of medications. This information does not endorse any treatments or medications as safe, effective, or approved for treating a specific patient. [employer] Inc[employer] and its affiliates disclaim any warranty or liability relating to this information or the use thereof. The use of this information is governed by the Terms of Use, available at https://www.[employer].com/en/know/clinical-effectiveness-terms   Copyright   Copyright © 2024 [employer] Inc. and its affiliates and/or licensors. All rights reserved.  Patient Education     Elbow tendinopathy (tennis and golf elbow)   The Basics   Written by the doctors and editors at [employer]   What is elbow tendinopathy? -- Elbow tendinopathy is a condition that causes elbow pain and forearm weakness. The word \"tendinopathy\" refers to a problem with a " "tendon. Tendons are strong bands of tissue that connect muscles to bones.  Depending on which elbow tendon is injured, the condition is also known as \"tennis elbow\" or \"golf elbow.\" Doctors also used to call this condition \"tendinitis.\"  What causes elbow tendinopathy? -- This condition can happen as people get older, especially if they do a lot of work or activity using their elbow and forearm. It can also happen when people get hurt or do the same movements over and over.  The terms \"tennis elbow\" and \"golf elbow\" refer to the swinging motion people do in these sports. This can cause tendinopathy. But other activities or jobs can also cause this problem if they involve similar movements.  What are the symptoms of elbow tendinopathy? -- The most common symptoms are:   Elbow pain - This is the main symptom of elbow tendinopathy. Pain can start slowly or suddenly, and can be mild or more severe. It can spread to the upper arm or forearm. Pain is most common when the tendon is working or stretched.   Muscle weakness - The forearm muscles might feel weak when you  or squeeze something.   Swelling - Some people might have mild swelling in the elbow area.  Will I need tests? -- Maybe. Your doctor or nurse should be able to tell if you have elbow tendinopathy by talking with you and doing an exam. They might also have you do specific arm movements to better understand what motions or activities cause pain.  In some cases, the doctor might also do an imaging test, such as an ultrasound. Imaging tests create pictures of the inside of the body.  How is elbow tendinopathy treated? -- Most of the time, it gets better on its own, but it can take months to heal completely. To help get better, you can:   Rest your elbow and arm - If possible, try to avoid or reduce activities that make your pain worse.   Wear a brace or sleeve - Ask your doctor or nurse about this. Wearing a special brace or \"compression sleeve\" can help " support your elbow and relieve pain. These work by reducing strain on the tendon when you use your arm.   Take a pain-relieving medicine - Your doctor might recommend that you take a medicine such as acetaminophen (sample brand name: Tylenol), ibuprofen (sample brand names: Advil, Motrin), or naproxen (sample brand names: Aleve, Naprosyn).   Put ice on your elbow - This might help after doing activities that make your pain worse. Put a cold gel pack, bag of ice, or bag of frozen vegetables on the area every 1 to 2 hours, for 15 minutes each time. Put a thin towel between the ice (or other cold object) and your skin.   Get physical therapy - This involves learning specific exercises to strengthen your muscles. This can help with your symptoms. The right exercises for you depend on which elbow tendon is injured. Your doctor or nurse can show you how to do these types of exercises. They will tell you when to start them and how often to do them. They might also refer you to a physical therapist (exercise expert).  Stretching the muscles in your lower arm can also be helpful. Depending on which tendon is injured, you can do stretches like:   Tennis elbow stretch (also called forearm extensor stretch) - Hold your injured arm straight out, and point your fingers down to the ground. Use your other hand (with the thumb pressing on the palm) to grab this hand. Then, press down on the back of the hand to bend the wrist more (picture 1). Hold this position for 30 seconds. Repeat the stretch 3 times. Do this exercise 1 time a day.   Golf elbow stretch - Stand an arm's length away from the wall, with the injured arm closest to the wall. Put your palm on the wall, with your fingers pointing down. Press gently against the wall to stretch your muscles (picture 2). Hold this position for 30 seconds. Repeat the stretch 3 times. Do this exercise 1 time a day.  What if my symptoms don't get better? -- If you have been doing your  exercises for at least 8 to 12 weeks and your symptoms are not getting better, talk with your doctor or nurse. They can suggest other treatments that might help. They might also want to do imaging tests, like an ultrasound or X-ray, to see if something else might be causing your symptoms.  Rarely, elbow tendinopathy is treated with surgery. This might be considered if other treatments do not help after many months. But the condition usually gets better without surgery.  Can elbow tendinopathy be prevented? -- Yes. To help prevent elbow tendinopathy, you can:   Take breaks when you do activities that involve moving your elbow and wrist a lot.   Keep your elbows slightly bent when you exercise or lift things.   Wear gloves or use 2 hands when using tools.   If you play tennis, use a 2-handed backhand swing.   If you play golf, use  tape or padding on your golf clubs.  All topics are updated as new evidence becomes available and our peer review process is complete.  This topic retrieved from [employer] on: Mar 16, 2024.  Topic 55809 Version 8.0  Release: 32.2.4 - C32.74  © 2024 UpToDate, Inc. and/or its affiliates. All rights reserved.  picture 1: Forearm extensor stretch     Hold your injured arm straight out, and point yourfingers down to the ground. Use your other hand (with the thumb pressing on thepalm) to grab this hand. Then, press down on the back of the hand to bend thewrist more.Hold this position for 30 seconds. Repeat the stretch 3 times. Do this exercise1 time a day.  Graphic 27916 Version 7.0  picture 2: Golf elbow stretch     Stand an arm's length away from the wall, with the injured arm closest to the wall. Put your palm on the wall, with your fingers pointing down. Press gently against the wall to stretch your muscles. Hold this position for 30 seconds. Repeat the stretch 3 times. Do this exercise 1 time a day.  Graphic 06551 Version 1.0  Consumer Information Use and Disclaimer   Disclaimer: This  generalized information is a limited summary of diagnosis, treatment, and/or medication information. It is not meant to be comprehensive and should be used as a tool to help the user understand and/or assess potential diagnostic and treatment options. It does NOT include all information about conditions, treatments, medications, side effects, or risks that may apply to a specific patient. It is not intended to be medical advice or a substitute for the medical advice, diagnosis, or treatment of a health care provider based on the health care provider's examination and assessment of a patient's specific and unique circumstances. Patients must speak with a health care provider for complete information about their health, medical questions, and treatment options, including any risks or benefits regarding use of medications. This information does not endorse any treatments or medications as safe, effective, or approved for treating a specific patient. Up[employer]Date, Inc. and its affiliates disclaim any warranty or liability relating to this information or the use thereof.The use of this information is governed by the Terms of Use, available at https://www.[employer].com/en/know/clinical-effectiveness-terms. 2024© UpToDate, Inc. and its affiliates and/or licensors. All rights reserved.  Copyright   © 2024 UpToDate, Inc. and/or its affiliates. All rights reserved.

## 2024-09-11 NOTE — ASSESSMENT & PLAN NOTE
- Not well controlled.   - Prescription sent for vistaril 25 mg at bedtime as needed. Discussed side effects.  - Will continue to monitor.    Orders:    hydrOXYzine pamoate (VISTARIL) 25 mg capsule; Take 1 capsule (25 mg total) by mouth daily at bedtime as needed (insomnia)

## 2024-09-11 NOTE — ASSESSMENT & PLAN NOTE
- Prescription sent for diclofenac 75 mg twice daily. Discussed side effects.  - Recommend tennis elbow brace.  - Exercises provided in After Visit Summary.  - Contact office if no improvement.     Orders:    Elastic Bandages & Supports (ACE Tennis Elbow Brace) MISC; Use in the morning    diclofenac (VOLTAREN) 75 mg EC tablet; Take 1 tablet (75 mg total) by mouth 2 (two) times a day for 14 days

## 2024-09-11 NOTE — ASSESSMENT & PLAN NOTE
- Will check labs.   - Encourage healthy diet and regular exercise.     Orders:    Insulin, fasting; Future    Cortisol Level, AM Specimen; Future

## 2024-09-11 NOTE — PROGRESS NOTES
Ambulatory Visit  Name: Nancy Marr      : 1980      MRN: [id number]  Encounter Provider: ANJU Moscoso  Encounter Date: 2024   [facility]    Assessment & Plan  Medial epicondylitis of right elbow  - Prescription sent for diclofenac 75 mg twice daily. Discussed side effects.  - Recommend tennis elbow brace.  - Exercises provided in After Visit Summary.  - Contact office if no improvement.     Orders:    Elastic Bandages & Supports (ACE Tennis Elbow Brace) MISC; Use in the morning    diclofenac (VOLTAREN) 75 mg EC tablet; Take 1 tablet (75 mg total) by mouth 2 (two) times a day for 14 days    Sleep disturbance  - Not well controlled.   - Prescription sent for vistaril 25 mg at bedtime as needed. Discussed side effects.  - Will continue to monitor.    Orders:    hydrOXYzine pamoate (VISTARIL) 25 mg capsule; Take 1 capsule (25 mg total) by mouth daily at bedtime as needed (insomnia)    Weight gain  - Will check labs.   - Encourage healthy diet and regular exercise.     Orders:    Insulin, fasting; Future    Cortisol Level, AM Specimen; Future    Hypertension, unspecified type  - Well controlled on amlodipine 5 mg daily. Continue same.   - Will continue to monitor.               History of Present Illness     Patient presents to office today with complaints of pain near both elbows. Has been occurring for few weeks. Thinks it might be related to work. Has not taken anything OTC.   Also reports difficulty sleeping. Sometimes takes OTC sleep aid but feels very groggy in the morning. Also reports weight gain. Would like labs checked. Denies any other concerns or complaints today.           Review of Systems   Constitutional:  Positive for unexpected weight change (weight gain). Negative for fatigue and fever.   HENT:  Negative for trouble swallowing.    Eyes:  Negative for visual disturbance.   Respiratory:  Negative for cough and shortness of  breath.    Cardiovascular:  Negative for chest pain and palpitations.   Gastrointestinal:  Negative for abdominal pain and blood in stool.   Endocrine: Negative for cold intolerance and heat intolerance.   Genitourinary:  Negative for difficulty urinating and dysuria.   Musculoskeletal:  Positive for arthralgias. Negative for gait problem.   Skin:  Negative for rash.   Neurological:  Negative for dizziness, syncope and headaches.   Hematological:  Negative for adenopathy.   Psychiatric/Behavioral:  Positive for sleep disturbance. Negative for behavioral problems.      Past Medical History:   Diagnosis Date    Asthma     childhood asthma -resolved at this time    CPAP (continuous positive airway pressure) dependence     Pap smear for cervical cancer screening     6/2023-wnl, +HRHPV other; 7/2024-wnl, HRHPV neg, 7/2025    Sleep apnea      Past Surgical History:   Procedure Laterality Date    BREAST BIOPSY Right 2021    benign    CHOLECYSTECTOMY  2019    COLONOSCOPY      6/2024-inadequate prep, polyp removed; repeat 12/2024    EYELID LACERATION REPAIR Left     cyst of left eyelid removed as a child    PLANTAR FASCIA SURGERY Right 09/13/2023    Procedure: RELEASE FASCIA PLANTAR/FASCIOTOMY OPEN;  Surgeon: Paxton Malloy DPM;  Location:  MAIN OR;  Service: Podiatry    KS GASTROCNEMIUS RECESSION Right 09/13/2023    Procedure: RECESSION GASTROCNEMIUS OPEN;  Surgeon: Paxton Malloy DPM;  Location:  MAIN OR;  Service: Podiatry     Family History   Problem Relation Age of Onset    Hypertension Mother     Hypertension Maternal Grandmother     No Known Problems Maternal Grandfather     Breast cancer Maternal Great-Grandmother     Anesthesia problems Neg Hx     Ovarian cancer Neg Hx     Colon cancer Neg Hx      Social History     Tobacco Use    Smoking status: Never    Smokeless tobacco: Never    Tobacco comments:     Never a smoker or use of any tobacco products    Vaping Use    Vaping status: Never Used   Substance and Sexual  "Activity    Alcohol use: Yes     Comment: social -every 2-3 months, only 1 drink    Drug use: Never     Comment: Denies any drug use per pt    Sexual activity: Yes     Partners: Male     Birth control/protection: Male Sterilization     Comment: lifetime partners: 5; current partner: 2009     Current Outpatient Medications on File Prior to Visit   Medication Sig    amLODIPine (NORVASC) 5 mg tablet Take 1 tablet (5 mg total) by mouth daily     Allergies   Allergen Reactions    Dipyrone Other (See Comments)     Facial swelling. NB: this is a NSAID that causes agranulcytosis and is not used anymore.    Lisinopril Cough     Immunization History   Administered Date(s) Administered    COVID-19 J&J (Bungles Jungles) vaccine 0.5 mL 04/06/2021    COVID-19 PFIZER VACCINE 0.3 ML IM 12/30/2021    HPV9 07/07/2023    Hep B, adult 05/06/2023, 06/27/2023    INFLUENZA 11/12/2012, 10/15/2013, 11/13/2014, 10/14/2015, 10/14/2015, 10/08/2022    Influenza Recombinant Preservative Free Im 08/03/2024    Influenza, injectable, quadrivalent, preservative free 0.5 mL 09/01/2020    Tdap 06/20/2022     Objective     /84 (BP Location: Left arm, Patient Position: Sitting, Cuff Size: Large)   Pulse 82   Temp 98.1 °F (36.7 °C) (Tympanic)   Resp 16   Ht 5' 5\" (1.651 m)   Wt 77.6 kg (171 lb)   SpO2 99%   BMI 28.46 kg/m²     Physical Exam  Vitals and nursing note reviewed.   Constitutional:       Appearance: Normal appearance. She is well-developed.   HENT:      Head: Normocephalic and atraumatic.      Right Ear: External ear normal.      Left Ear: External ear normal.   Eyes:      Conjunctiva/sclera: Conjunctivae normal.   Cardiovascular:      Rate and Rhythm: Normal rate and regular rhythm.      Heart sounds: Normal heart sounds.   Pulmonary:      Effort: Pulmonary effort is normal.      Breath sounds: Normal breath sounds.   Musculoskeletal:         General: Normal range of motion.      Right elbow: Tenderness present in medial epicondyle.      " Left elbow: Tenderness present in medial epicondyle.      Cervical back: Normal range of motion.   Skin:     General: Skin is warm and dry.   Neurological:      Mental Status: She is alert and oriented to person, place, and time.   Psychiatric:         Mood and Affect: Mood normal.         Behavior: Behavior normal.

## 2024-09-18 ENCOUNTER — APPOINTMENT (OUTPATIENT)
Dept: LAB | Age: 44
End: 2024-09-18
Payer: COMMERCIAL

## 2024-09-18 DIAGNOSIS — R63.5 WEIGHT GAIN: ICD-10-CM

## 2024-09-18 LAB
CORTIS AM PEAK SERPL-MCNC: 7.7 UG/DL (ref 6.7–22.6)
INSULIN SERPL-ACNC: 15.19 UIU/ML (ref 1.9–23)

## 2024-09-18 PROCEDURE — 83525 ASSAY OF INSULIN: CPT

## 2024-09-18 PROCEDURE — 82533 TOTAL CORTISOL: CPT

## 2024-09-18 PROCEDURE — 36415 COLL VENOUS BLD VENIPUNCTURE: CPT

## 2024-09-27 ENCOUNTER — TELEPHONE (OUTPATIENT)
Dept: FAMILY MEDICINE CLINIC | Facility: CLINIC | Age: 44
End: 2024-09-27

## 2024-09-27 NOTE — TELEPHONE ENCOUNTER
----- Message from ANJU Oleary sent at 9/27/2024  6:58 AM EDT -----  Fasting insulin and cortisol both normal.

## 2024-10-16 ENCOUNTER — TELEPHONE (OUTPATIENT)
Age: 44
End: 2024-10-16

## 2024-10-16 ENCOUNTER — PREP FOR PROCEDURE (OUTPATIENT)
Age: 44
End: 2024-10-16

## 2024-10-16 DIAGNOSIS — Z86.0100 HISTORY OF COLON POLYPS: Primary | ICD-10-CM

## 2024-10-16 NOTE — TELEPHONE ENCOUNTER
10/16/24  Screened by: Aaliyah Londono    Referring Provider Dr. Amaral    Pre- Screening:     There is no height or weight on file to calculate BMI.  28.46  Has patient been referred for a routine screening Colonoscopy? yes  Is the patient between 45-75 years old? yes      Previous Colonoscopy yes   If yes:    Date: 2024    Facility:     Reason:         Does the patient want to see a Gastroenterologist prior to their procedure OR are they having any GI symptoms? no    Has the patient been hospitalized or had abdominal surgery in the past 6 months? no    Does the patient use supplemental oxygen? no    Does the patient take Coumadin, Lovenox, Plavix, Elliquis, Xarelto, or other blood thinning medication? no    Has the patient had a stroke, cardiac event, or stent placed in the past year? no      If patient is between 45yrs - 49yrs, please advise patient that we will have to confirm benefits & coverage with their insurance company for a routine screening colonoscopy.

## 2024-12-04 ENCOUNTER — OFFICE VISIT (OUTPATIENT)
Dept: FAMILY MEDICINE CLINIC | Facility: CLINIC | Age: 44
End: 2024-12-04
Payer: COMMERCIAL

## 2024-12-04 VITALS
SYSTOLIC BLOOD PRESSURE: 132 MMHG | BODY MASS INDEX: 29.56 KG/M2 | TEMPERATURE: 97.6 F | RESPIRATION RATE: 16 BRPM | DIASTOLIC BLOOD PRESSURE: 84 MMHG | HEART RATE: 85 BPM | WEIGHT: 177.4 LBS | OXYGEN SATURATION: 98 % | HEIGHT: 65 IN

## 2024-12-04 DIAGNOSIS — G47.9 SLEEP DISTURBANCE: ICD-10-CM

## 2024-12-04 DIAGNOSIS — E66.3 OVERWEIGHT WITH BODY MASS INDEX (BMI) OF 29 TO 29.9 IN ADULT: ICD-10-CM

## 2024-12-04 DIAGNOSIS — E78.5 DYSLIPIDEMIA: ICD-10-CM

## 2024-12-04 DIAGNOSIS — I10 HYPERTENSION, UNSPECIFIED TYPE: Primary | ICD-10-CM

## 2024-12-04 PROCEDURE — 99214 OFFICE O/P EST MOD 30 MIN: CPT | Performed by: NURSE PRACTITIONER

## 2024-12-04 RX ORDER — PHENTERMINE HYDROCHLORIDE 37.5 MG/1
37.5 TABLET ORAL DAILY
Qty: 30 TABLET | Refills: 0 | Status: SHIPPED | OUTPATIENT
Start: 2024-12-04

## 2024-12-04 RX ORDER — HYDROXYZINE HYDROCHLORIDE 10 MG/1
10 TABLET, FILM COATED ORAL DAILY PRN
Qty: 90 TABLET | Refills: 0 | Status: SHIPPED | OUTPATIENT
Start: 2024-12-04

## 2024-12-04 RX ORDER — AMLODIPINE BESYLATE 5 MG/1
5 TABLET ORAL DAILY
Qty: 90 TABLET | Refills: 1 | Status: SHIPPED | OUTPATIENT
Start: 2024-12-04

## 2024-12-04 NOTE — PROGRESS NOTES
Name: Nancy Marr      : 1980      MRN: [id number]  Encounter Provider: ANJU Moscoso  Encounter Date: 2024   Encounter department: [facility] [address] [facility]    Assessment & Plan  Hypertension, unspecified type  - Well controlled on amlodipine 5 mg daily. Continue same.   - Will continue to monitor.   Orders:    CBC and differential; Future    Comprehensive metabolic panel; Future    Lipid Panel with Direct LDL reflex; Future    TSH, 3rd generation with Free T4 reflex; Future    amLODIPine (NORVASC) 5 mg tablet; Take 1 tablet (5 mg total) by mouth daily    Dyslipidemia  Component      Latest Ref Rng 2024   Cholesterol      See Comment mg/dL 194    Triglycerides      See Comment mg/dL 125    HDL      >=50 mg/dL 46 (L)    LDL Calculated      0 - 100 mg/dL 123 (H)       - Encourage healthy diet and regular exercise.  - Will continue to monitor fasting lipid panel.   Orders:    Lipid Panel with Direct LDL reflex; Future    Sleep disturbance  - Prescription sent for hydroxyzine 10 mg as needed at bedtime. Discussed side effects.  - Will continue to monitor.   Orders:    hydrOXYzine HCL (ATARAX) 10 mg tablet; Take 1 tablet (10 mg total) by mouth daily as needed (sleep)    Overweight with body mass index (BMI) of 29 to 29.9 in adult  - Prescription sent for phentermine 37.5 mg daily. Discussed side effects.  - Encourage healthy diet and regular exercise.  - Recommend follow up in 1 month.     Orders:    phentermine (ADIPEX-P) 37.5 MG tablet; Take 1 tablet (37.5 mg total) by mouth in the morning         History of Present Illness     Patient with PMH of HTN presents to office today for follow up. She is taking her prescribed medications. She was prescribed Vistaril for sleep but it made her too groggy. She is having issues with weight gain. Has gained 11 pounds since July. Fasting insulin and cortisol were both normal in September. She is interested in weight loss medications.  She denies any other concerns or complaints today.          Review of Systems   Constitutional:  Negative for fatigue and fever.   HENT:  Negative for trouble swallowing.    Eyes:  Negative for visual disturbance.   Respiratory:  Negative for cough and shortness of breath.    Cardiovascular:  Negative for chest pain and palpitations.   Gastrointestinal:  Negative for abdominal pain and blood in stool.   Endocrine: Negative for cold intolerance and heat intolerance.   Genitourinary:  Negative for difficulty urinating and dysuria.   Musculoskeletal:  Negative for gait problem.   Skin:  Negative for rash.   Neurological:  Negative for dizziness, syncope and headaches.   Hematological:  Negative for adenopathy.   Psychiatric/Behavioral:  Negative for behavioral problems.      Past Medical History:   Diagnosis Date    Asthma     childhood asthma -resolved at this time    CPAP (continuous positive airway pressure) dependence     Pap smear for cervical cancer screening     6/2023-wnl, +HRHPV other; 7/2024-wnl, HRHPV neg, 7/2025    Sleep apnea      Past Surgical History:   Procedure Laterality Date    BREAST BIOPSY Right 2021    benign    CHOLECYSTECTOMY  2019    COLONOSCOPY      6/2024-inadequate prep, polyp removed; repeat 12/2024    EYELID LACERATION REPAIR Left     cyst of left eyelid removed as a child    PLANTAR FASCIA SURGERY Right 09/13/2023    Procedure: RELEASE FASCIA PLANTAR/FASCIOTOMY OPEN;  Surgeon: Paxton Malloy DPM;  Location:  MAIN OR;  Service: Podiatry    [place] GASTROCNEMIUS RECESSION Right 09/13/2023    Procedure: RECESSION GASTROCNEMIUS OPEN;  Surgeon: Paxton Malloy DPM;  Location:  MAIN OR;  Service: Podiatry     Family History   Problem Relation Age of Onset    Hypertension Mother     Hypertension Maternal Grandmother     No Known Problems Maternal Grandfather     Breast cancer Maternal Great-Grandmother     Anesthesia problems Neg Hx     Ovarian cancer Neg Hx     Colon cancer Neg Hx      Social  "History     Tobacco Use    Smoking status: Never    Smokeless tobacco: Never    Tobacco comments:     Never a smoker or use of any tobacco products    Vaping Use    Vaping status: Never Used   Substance and Sexual Activity    Alcohol use: Yes     Comment: social -every 2-3 months, only 1 drink    Drug use: Never     Comment: Denies any drug use per pt    Sexual activity: Yes     Partners: Male     Birth control/protection: Male Sterilization     Comment: lifetime partners: 5; current partner: 2009     Current Outpatient Medications on File Prior to Visit   Medication Sig    amLODIPine (NORVASC) 5 mg tablet Take 1 tablet (5 mg total) by mouth daily    diclofenac (VOLTAREN) 75 mg EC tablet Take 1 tablet (75 mg total) by mouth 2 (two) times a day for 14 days    hydrOXYzine pamoate (VISTARIL) 25 mg capsule Take 1 capsule (25 mg total) by mouth daily at bedtime as needed (insomnia) (Patient not taking: Reported on 12/4/2024)    [DISCONTINUED] Elastic Bandages & Supports (ACE Tennis Elbow Brace) MISC Use in the morning     Allergies   Allergen Reactions    Dipyrone Other (See Comments)     Facial swelling. NB: this is a NSAID that causes agranulcytosis and is not used anymore.    Lisinopril Cough     Immunization History   Administered Date(s) Administered    COVID-19 J&J (Onevest) vaccine 0.5 mL 04/06/2021    COVID-19 PFIZER VACCINE 0.3 ML IM 12/30/2021    HPV9 07/07/2023    Hep B, adult 05/06/2023, 06/27/2023    INFLUENZA 11/12/2012, 10/15/2013, 11/13/2014, 10/14/2015, 10/14/2015, 10/08/2022    Influenza Recombinant Preservative Free Im 08/03/2024    Influenza, injectable, quadrivalent, preservative free 0.5 mL 09/01/2020    Tdap 06/20/2022     Objective   /84 (BP Location: Left arm, Patient Position: Sitting, Cuff Size: Large)   Pulse 85   Temp 97.6 °F (36.4 °C) (Tympanic)   Resp 16   Ht 5' 5\" (1.651 m)   Wt 80.5 kg (177 lb 6.4 oz)   SpO2 98%   BMI 29.52 kg/m²     Physical Exam  Vitals and nursing note " reviewed.   Constitutional:       Appearance: Normal appearance. She is well-developed.   HENT:      Head: Normocephalic and atraumatic.      Right Ear: External ear normal.      Left Ear: External ear normal.   Eyes:      Conjunctiva/sclera: Conjunctivae normal.   Cardiovascular:      Rate and Rhythm: Normal rate and regular rhythm.      Heart sounds: Normal heart sounds.   Pulmonary:      Effort: Pulmonary effort is normal.      Breath sounds: Normal breath sounds.   Musculoskeletal:         General: Normal range of motion.      Cervical back: Normal range of motion.   Skin:     General: Skin is warm and dry.   Neurological:      Mental Status: She is alert and oriented to person, place, and time.   Psychiatric:         Mood and Affect: Mood normal.         Behavior: Behavior normal.

## 2024-12-04 NOTE — ASSESSMENT & PLAN NOTE
Component      Latest Ref Rng 2/28/2024   Cholesterol      See Comment mg/dL 194    Triglycerides      See Comment mg/dL 125    HDL      >=50 mg/dL 46 (L)    LDL Calculated      0 - 100 mg/dL 123 (H)       - Encourage healthy diet and regular exercise.  - Will continue to monitor fasting lipid panel.   Orders:    Lipid Panel with Direct LDL reflex; Future

## 2024-12-04 NOTE — ASSESSMENT & PLAN NOTE
- Well controlled on amlodipine 5 mg daily. Continue same.   - Will continue to monitor.   Orders:    CBC and differential; Future    Comprehensive metabolic panel; Future    Lipid Panel with Direct LDL reflex; Future    TSH, 3rd generation with Free T4 reflex; Future    amLODIPine (NORVASC) 5 mg tablet; Take 1 tablet (5 mg total) by mouth daily

## 2024-12-04 NOTE — ASSESSMENT & PLAN NOTE
- Prescription sent for hydroxyzine 10 mg as needed at bedtime. Discussed side effects.  - Will continue to monitor.   Orders:    hydrOXYzine HCL (ATARAX) 10 mg tablet; Take 1 tablet (10 mg total) by mouth daily as needed (sleep)

## 2024-12-05 ENCOUNTER — ANESTHESIA EVENT (OUTPATIENT)
Dept: ANESTHESIOLOGY | Facility: HOSPITAL | Age: 44
End: 2024-12-05

## 2024-12-05 ENCOUNTER — ANESTHESIA (OUTPATIENT)
Dept: ANESTHESIOLOGY | Facility: HOSPITAL | Age: 44
End: 2024-12-05

## 2024-12-13 ENCOUNTER — TELEPHONE (OUTPATIENT)
Dept: GASTROENTEROLOGY | Facility: MEDICAL CENTER | Age: 44
End: 2024-12-13

## 2024-12-17 ENCOUNTER — TELEPHONE (OUTPATIENT)
Age: 44
End: 2024-12-17

## 2024-12-17 NOTE — TELEPHONE ENCOUNTER
Pt rescheduled colon with Dr Amaral from 12/19/24 to 0220/25 but didn't give a reason. I spoke with Lisa at [facility] and made her aware.

## 2025-01-23 ENCOUNTER — OFFICE VISIT (OUTPATIENT)
Dept: FAMILY MEDICINE CLINIC | Facility: CLINIC | Age: 45
End: 2025-01-23
Payer: COMMERCIAL

## 2025-01-23 VITALS
HEIGHT: 65 IN | TEMPERATURE: 96.2 F | RESPIRATION RATE: 16 BRPM | WEIGHT: 173.4 LBS | SYSTOLIC BLOOD PRESSURE: 138 MMHG | HEART RATE: 75 BPM | DIASTOLIC BLOOD PRESSURE: 88 MMHG | BODY MASS INDEX: 28.89 KG/M2 | OXYGEN SATURATION: 99 %

## 2025-01-23 DIAGNOSIS — E66.3 OVERWEIGHT WITH BODY MASS INDEX (BMI) OF 28 TO 28.9 IN ADULT: ICD-10-CM

## 2025-01-23 DIAGNOSIS — I10 HYPERTENSION, UNSPECIFIED TYPE: Primary | ICD-10-CM

## 2025-01-23 DIAGNOSIS — G44.229 CHRONIC TENSION-TYPE HEADACHE, NOT INTRACTABLE: ICD-10-CM

## 2025-01-23 PROCEDURE — 99214 OFFICE O/P EST MOD 30 MIN: CPT | Performed by: NURSE PRACTITIONER

## 2025-01-23 RX ORDER — AMITRIPTYLINE HYDROCHLORIDE 10 MG/1
10 TABLET ORAL
Qty: 30 TABLET | Refills: 1 | Status: SHIPPED | OUTPATIENT
Start: 2025-01-23

## 2025-01-23 NOTE — PROGRESS NOTES
Name: Nancy Marr      : 1980      MRN: [id number]  Encounter Provider: ANJU Moscoso  Encounter Date: 2025   Encounter department: [facility] [address] [facility]    Assessment & Plan  Hypertension, unspecified type  - Fairly well controlled on amlodipine 5 mg daily. Continue same.   - Will continue to monitor.        Overweight with body mass index (BMI) of 28 to 28.9 in adult  - Down 4 pounds since last visit.  - Instructed to take half tablet of phentermine due to side effects.   - Encourage healthy diet and regular exercise.  - Recommend follow up in 1 month.        Chronic tension-type headache, not intractable  - Not well controlled.  - Prescription sent for amitriptyline 10 mg at bedtime. Discussed side effects.   - Follow up in 1 month.  - If no improvement consider referral to Neurology.   Orders:  •  amitriptyline (ELAVIL) 10 mg tablet; Take 1 tablet (10 mg total) by mouth daily at bedtime         History of Present Illness     Patient presents to office today for 1 month follow up. She was started on phentermine for weight loss. She is down 4 pounds since last visit. States she feels like the medication makes her jittery and anxious. Also complains of frequent headaches that occur almost every day. Headache feels like pressure. Sometimes she wakes up with a headache. Denies any sensitivity to light or sound. Denies nausea or vomiting. Takes OTC Advil or Tylenol as needed which sometimes helps. Went to the eye doctor who changed her prescription but this did not improve her headaches. She denies any other concerns or complaints today.          Review of Systems   Constitutional:  Negative for fatigue and fever.   HENT:  Negative for trouble swallowing.    Eyes:  Negative for visual disturbance.   Respiratory:  Negative for cough and shortness of breath.    Cardiovascular:  Negative for chest pain and palpitations.   Gastrointestinal:  Negative for abdominal pain and blood  in stool.   Endocrine: Negative for cold intolerance and heat intolerance.   Genitourinary:  Negative for difficulty urinating and dysuria.   Musculoskeletal:  Negative for gait problem.   Skin:  Negative for rash.   Neurological:  Positive for headaches. Negative for dizziness and syncope.   Hematological:  Negative for adenopathy.   Psychiatric/Behavioral:  Negative for behavioral problems.      Past Medical History:   Diagnosis Date   • Asthma     childhood asthma -resolved at this time   • CPAP (continuous positive airway pressure) dependence    • Pap smear for cervical cancer screening     6/2023-wnl, +HRHPV other; 7/2024-wnl, HRHPV neg, 7/2025   • Sleep apnea      Past Surgical History:   Procedure Laterality Date   • BREAST BIOPSY Right 2021    benign   • CHOLECYSTECTOMY  2019   • COLONOSCOPY      6/2024-inadequate prep, polyp removed; repeat 12/2024   • EYELID LACERATION REPAIR Left     cyst of left eyelid removed as a child   • PLANTAR FASCIA SURGERY Right 09/13/2023    Procedure: RELEASE FASCIA PLANTAR/FASCIOTOMY OPEN;  Surgeon: Paxton Malloy DPM;  Location:  MAIN OR;  Service: Podiatry   • [place] GASTROCNEMIUS RECESSION Right 09/13/2023    Procedure: RECESSION GASTROCNEMIUS OPEN;  Surgeon: Paxton Malloy DPM;  Location:  MAIN OR;  Service: Podiatry     Family History   Problem Relation Age of Onset   • Hypertension Mother    • Hypertension Maternal Grandmother    • No Known Problems Maternal Grandfather    • Breast cancer Maternal Great-Grandmother    • Anesthesia problems Neg Hx    • Ovarian cancer Neg Hx    • Colon cancer Neg Hx      Social History     Tobacco Use   • Smoking status: Never   • Smokeless tobacco: Never   • Tobacco comments:     Never a smoker or use of any tobacco products    Vaping Use   • Vaping status: Never Used   Substance and Sexual Activity   • Alcohol use: Yes     Comment: social -every 2-3 months, only 1 drink   • Drug use: Never     Comment: Denies any drug use per pt   • Sexual  "activity: Yes     Partners: Male     Birth control/protection: Male Sterilization     Comment: lifetime partners: 5; current partner: 2009     Current Outpatient Medications on File Prior to Visit   Medication Sig   • amLODIPine (NORVASC) 5 mg tablet Take 1 tablet (5 mg total) by mouth daily   • hydrOXYzine HCL (ATARAX) 10 mg tablet Take 1 tablet (10 mg total) by mouth daily as needed (sleep)   • phentermine (ADIPEX-P) 37.5 MG tablet Take 1 tablet (37.5 mg total) by mouth in the morning   • diclofenac (VOLTAREN) 75 mg EC tablet Take 1 tablet (75 mg total) by mouth 2 (two) times a day for 14 days   • hydrOXYzine pamoate (VISTARIL) 25 mg capsule Take 1 capsule (25 mg total) by mouth daily at bedtime as needed (insomnia) (Patient not taking: Reported on 1/23/2025)     Allergies   Allergen Reactions   • Dipyrone Other (See Comments)     Facial swelling. NB: this is a NSAID that causes agranulcytosis and is not used anymore.   • Lisinopril Cough     Immunization History   Administered Date(s) Administered   • COVID-19 J&J (Curetis) vaccine 0.5 mL 04/06/2021   • COVID-19 PFIZER VACCINE 0.3 ML IM 12/30/2021   • HPV9 07/07/2023   • Hep B, adult 05/06/2023, 06/27/2023   • INFLUENZA 11/12/2012, 10/15/2013, 11/13/2014, 10/14/2015, 10/14/2015, 10/08/2022   • Influenza Recombinant Preservative Free Im 08/03/2024   • Influenza, injectable, quadrivalent, preservative free 0.5 mL 09/01/2020   • Tdap 06/20/2022     Objective   /88 (BP Location: Left arm, Patient Position: Sitting, Cuff Size: Standard)   Pulse 75   Temp (!) 96.2 °F (35.7 °C) (Tympanic)   Resp 16   Ht 5' 5\" (1.651 m)   Wt 78.7 kg (173 lb 6.4 oz)   SpO2 99%   BMI 28.86 kg/m²     Physical Exam  Vitals and nursing note reviewed.   Constitutional:       Appearance: Normal appearance. She is well-developed.   HENT:      Head: Normocephalic and atraumatic.      Right Ear: External ear normal.      Left Ear: External ear normal.   Eyes:      Conjunctiva/sclera: " Conjunctivae normal.   Cardiovascular:      Rate and Rhythm: Normal rate and regular rhythm.      Heart sounds: Normal heart sounds.   Pulmonary:      Effort: Pulmonary effort is normal.      Breath sounds: Normal breath sounds.   Musculoskeletal:         General: Normal range of motion.      Cervical back: Normal range of motion.   Skin:     General: Skin is warm and dry.   Neurological:      Mental Status: She is alert and oriented to person, place, and time.   Psychiatric:         Mood and Affect: Mood normal.         Behavior: Behavior normal.

## 2025-01-23 NOTE — ASSESSMENT & PLAN NOTE
- Fairly well controlled on amlodipine 5 mg daily. Continue same.   - Will continue to monitor.

## 2025-01-23 NOTE — ASSESSMENT & PLAN NOTE
- Down 4 pounds since last visit.  - Instructed to take half tablet of phentermine due to side effects.   - Encourage healthy diet and regular exercise.  - Recommend follow up in 1 month.

## 2025-02-06 ENCOUNTER — ANESTHESIA (OUTPATIENT)
Dept: ANESTHESIOLOGY | Facility: HOSPITAL | Age: 45
End: 2025-02-06

## 2025-02-06 ENCOUNTER — ANESTHESIA EVENT (OUTPATIENT)
Dept: ANESTHESIOLOGY | Facility: HOSPITAL | Age: 45
End: 2025-02-06

## 2025-02-14 ENCOUNTER — TELEPHONE (OUTPATIENT)
Dept: GASTROENTEROLOGY | Facility: MEDICAL CENTER | Age: 45
End: 2025-02-14

## 2025-02-20 ENCOUNTER — HOSPITAL ENCOUNTER (OUTPATIENT)
Dept: GASTROENTEROLOGY | Facility: MEDICAL CENTER | Age: 45
Setting detail: OUTPATIENT SURGERY
End: 2025-02-20
Payer: COMMERCIAL

## 2025-02-20 ENCOUNTER — ANESTHESIA (OUTPATIENT)
Dept: GASTROENTEROLOGY | Facility: MEDICAL CENTER | Age: 45
End: 2025-02-20
Payer: COMMERCIAL

## 2025-02-20 ENCOUNTER — ANESTHESIA EVENT (OUTPATIENT)
Dept: GASTROENTEROLOGY | Facility: MEDICAL CENTER | Age: 45
End: 2025-02-20
Payer: COMMERCIAL

## 2025-02-20 VITALS
WEIGHT: 173 LBS | BODY MASS INDEX: 28.82 KG/M2 | DIASTOLIC BLOOD PRESSURE: 70 MMHG | SYSTOLIC BLOOD PRESSURE: 127 MMHG | OXYGEN SATURATION: 99 % | HEIGHT: 65 IN | RESPIRATION RATE: 16 BRPM | TEMPERATURE: 97.9 F | HEART RATE: 65 BPM

## 2025-02-20 DIAGNOSIS — Z86.0100 HISTORY OF COLON POLYPS: ICD-10-CM

## 2025-02-20 PROCEDURE — 45385 COLONOSCOPY W/LESION REMOVAL: CPT | Performed by: STUDENT IN AN ORGANIZED HEALTH CARE EDUCATION/TRAINING PROGRAM

## 2025-02-20 PROCEDURE — 88305 TISSUE EXAM BY PATHOLOGIST: CPT | Performed by: PATHOLOGY

## 2025-02-20 RX ORDER — PROPOFOL 10 MG/ML
INJECTION, EMULSION INTRAVENOUS AS NEEDED
Status: DISCONTINUED | OUTPATIENT
Start: 2025-02-20 | End: 2025-02-20

## 2025-02-20 RX ORDER — SODIUM CHLORIDE, SODIUM LACTATE, POTASSIUM CHLORIDE, CALCIUM CHLORIDE 600; 310; 30; 20 MG/100ML; MG/100ML; MG/100ML; MG/100ML
INJECTION, SOLUTION INTRAVENOUS CONTINUOUS PRN
Status: DISCONTINUED | OUTPATIENT
Start: 2025-02-20 | End: 2025-02-20

## 2025-02-20 RX ADMIN — SODIUM CHLORIDE, SODIUM LACTATE, POTASSIUM CHLORIDE, AND CALCIUM CHLORIDE: .6; .31; .03; .02 INJECTION, SOLUTION INTRAVENOUS at 14:16

## 2025-02-20 RX ADMIN — PROPOFOL 40 MG: 10 INJECTION, EMULSION INTRAVENOUS at 14:25

## 2025-02-20 RX ADMIN — PROPOFOL 30 MG: 10 INJECTION, EMULSION INTRAVENOUS at 14:34

## 2025-02-20 RX ADMIN — PROPOFOL 50 MG: 10 INJECTION, EMULSION INTRAVENOUS at 14:23

## 2025-02-20 RX ADMIN — PROPOFOL 30 MG: 10 INJECTION, EMULSION INTRAVENOUS at 14:36

## 2025-02-20 RX ADMIN — PROPOFOL 40 MG: 10 INJECTION, EMULSION INTRAVENOUS at 14:29

## 2025-02-20 RX ADMIN — Medication 40 MG: at 14:26

## 2025-02-20 RX ADMIN — PROPOFOL 100 MG: 10 INJECTION, EMULSION INTRAVENOUS at 14:21

## 2025-02-20 NOTE — H&P
History and Physical -  [facility]  Nancy Marr 45 y.o. female MRN: [id number]                  HPI: Nancy Marr is a 45 y.o. year old female who presents for history of colon polyps      REVIEW OF SYSTEMS: Per the HPI, and otherwise unremarkable.    Historical Information   Past Medical History:   Diagnosis Date    Asthma     childhood asthma -resolved at this time    CPAP (continuous positive airway pressure) dependence     Pap smear for cervical cancer screening     6/2023-wnl, +HRHPV other; 7/2024-wnl, HRHPV neg, 7/2025    Sleep apnea      Past Surgical History:   Procedure Laterality Date    BREAST BIOPSY Right 2021    benign    CHOLECYSTECTOMY  2019    COLONOSCOPY      6/2024-inadequate prep, polyp removed; repeat 12/2024    EYELID LACERATION REPAIR Left     cyst of left eyelid removed as a child    PLANTAR FASCIA SURGERY Right 09/13/2023    Procedure: RELEASE FASCIA PLANTAR/FASCIOTOMY OPEN;  Surgeon: Paxton Malloy DPM;  Location:  MAIN OR;  Service: Podiatry    [place] GASTROCNEMIUS RECESSION Right 09/13/2023    Procedure: RECESSION GASTROCNEMIUS OPEN;  Surgeon: Paxton Malloy DPM;  Location:  MAIN OR;  Service: Podiatry     Social History   Social History     Substance and Sexual Activity   Alcohol Use Yes    Comment: social -every 2-3 months, only 1 drink     Social History     Substance and Sexual Activity   Drug Use Never    Comment: Denies any drug use per pt     Social History     Tobacco Use   Smoking Status Never   Smokeless Tobacco Never   Tobacco Comments    Never a smoker or use of any tobacco products      Family History   Problem Relation Age of Onset    Hypertension Mother     Hypertension Maternal Grandmother     No Known Problems Maternal Grandfather     Breast cancer Maternal Great-Grandmother     Anesthesia problems Neg Hx     Ovarian cancer Neg Hx     Colon cancer Neg Hx        Meds/Allergies       Current Outpatient Medications:     amitriptyline (ELAVIL) 10 mg  "tablet    amLODIPine (NORVASC) 5 mg tablet    hydrOXYzine HCL (ATARAX) 10 mg tablet    hydrOXYzine pamoate (VISTARIL) 25 mg capsule    phentermine (ADIPEX-P) 37.5 MG tablet    diclofenac (VOLTAREN) 75 mg EC tablet    Allergies   Allergen Reactions    Dipyrone Other (See Comments)     Facial swelling. NB: this is a NSAID that causes agranulcytosis and is not used anymore.    Lisinopril Cough       Objective     /78   Pulse 69   Temp 97.9 °F (36.6 °C) (Temporal)   Resp 18   Ht 5' 5\" (1.651 m)   Wt 78.5 kg (173 lb)   SpO2 100%   BMI 28.79 kg/m²       PHYSICAL EXAM    Gen: NAD  Head: NCAT  CV: RRR  CHEST: Clear  ABD: soft, NT/ND  EXT: no edema      ASSESSMENT/PLAN:  This is a 45 y.o. year old female here for colonoscopy, and she is stable and optimized for her procedure.        "

## 2025-02-24 PROCEDURE — 88305 TISSUE EXAM BY PATHOLOGIST: CPT | Performed by: PATHOLOGY

## 2025-02-25 ENCOUNTER — RESULTS FOLLOW-UP (OUTPATIENT)
Dept: GASTROENTEROLOGY | Facility: CLINIC | Age: 45
End: 2025-02-25

## 2025-03-20 ENCOUNTER — OFFICE VISIT (OUTPATIENT)
Dept: FAMILY MEDICINE CLINIC | Facility: CLINIC | Age: 45
End: 2025-03-20
Payer: COMMERCIAL

## 2025-03-20 VITALS
SYSTOLIC BLOOD PRESSURE: 120 MMHG | HEIGHT: 65 IN | RESPIRATION RATE: 16 BRPM | TEMPERATURE: 96.9 F | WEIGHT: 179.2 LBS | HEART RATE: 82 BPM | DIASTOLIC BLOOD PRESSURE: 82 MMHG | BODY MASS INDEX: 29.85 KG/M2 | OXYGEN SATURATION: 98 %

## 2025-03-20 DIAGNOSIS — E78.5 DYSLIPIDEMIA: ICD-10-CM

## 2025-03-20 DIAGNOSIS — I10 HYPERTENSION, UNSPECIFIED TYPE: ICD-10-CM

## 2025-03-20 DIAGNOSIS — Z00.00 HEALTHCARE MAINTENANCE: ICD-10-CM

## 2025-03-20 DIAGNOSIS — E66.3 OVERWEIGHT WITH BODY MASS INDEX (BMI) OF 29 TO 29.9 IN ADULT: Primary | ICD-10-CM

## 2025-03-20 PROCEDURE — 99214 OFFICE O/P EST MOD 30 MIN: CPT | Performed by: NURSE PRACTITIONER

## 2025-03-20 PROCEDURE — 99396 PREV VISIT EST AGE 40-64: CPT | Performed by: NURSE PRACTITIONER

## 2025-03-20 NOTE — PROGRESS NOTES
Name: Nancy Marr      : 1980      MRN: [id number]  Encounter Provider: ANJU Moscoso  Encounter Date: 3/20/2025   Encounter department: [facility] [address] [facility]    Assessment & Plan  Overweight with body mass index (BMI) of 29 to 29.9 in adult  - Has gained a few pounds since last visit.  - Tried phentermine but couldn't tolerate due to side effects.  - GLP-1s not covered by her insurance.  - She was referred to Weight Management.   Orders:  •  Ambulatory Referral to Weight Management; Future    Hypertension, unspecified type  - Well controlled on amlodipine 5 mg daily. Continue same.   - Will continue to monitor.        Dyslipidemia  Component      Latest Ref Rng 2024   Cholesterol      See Comment mg/dL 194    Triglycerides      See Comment mg/dL 125    HDL      >=50 mg/dL 46 (L)    LDL Calculated      0 - 100 mg/dL 123 (H)     - Not well controlled.  - Encourage healthy diet and regular exercise.  - Will continue to monitor fasting lipid panel.        Healthcare maintenance  - Reviewed immunizations and screenings.  - UTD with dental, vision, and GYN exams.  - Mammogram due in July.  - UTD with colonoscopy.             History of Present Illness     Patient presents to office today for follow up. She was taking phentermine and was down a few pounds but couldn't tolerate the side effects. She was advised to take half tablet and still couldn't tolerate.  She is up a few pounds since last visit.  GLP-1's are not covered by her insurance.  She is interested in seeing weight management.  Her headaches that she complained about last visit are improved.  She is still due for updated blood work.  She denies any other concerns or complaints today.          Review of Systems   Constitutional:  Negative for fatigue and fever.   HENT:  Negative for congestion, rhinorrhea and trouble swallowing.    Eyes:  Negative for visual disturbance.   Respiratory:  Negative for cough and  shortness of breath.    Cardiovascular:  Negative for chest pain and palpitations.   Gastrointestinal:  Negative for abdominal pain and blood in stool.   Endocrine: Negative for cold intolerance and heat intolerance.   Genitourinary:  Negative for difficulty urinating, dysuria and frequency.   Musculoskeletal:  Negative for gait problem.   Skin:  Negative for rash.   Neurological:  Negative for dizziness, syncope and headaches.   Hematological:  Negative for adenopathy.   Psychiatric/Behavioral:  Negative for behavioral problems.      Past Medical History:   Diagnosis Date   • Asthma     childhood asthma -resolved at this time   • CPAP (continuous positive airway pressure) dependence    • Pap smear for cervical cancer screening     6/2023-wnl, +HRHPV other; 7/2024-wnl, HRHPV neg, 7/2025   • Sleep apnea      Past Surgical History:   Procedure Laterality Date   • BREAST BIOPSY Right 2021    benign   • CHOLECYSTECTOMY  2019   • COLONOSCOPY      6/2024-inadequate prep, polyp removed; repeat 12/2024   • EYELID LACERATION REPAIR Left     cyst of left eyelid removed as a child   • PLANTAR FASCIA SURGERY Right 09/13/2023    Procedure: RELEASE FASCIA PLANTAR/FASCIOTOMY OPEN;  Surgeon: Paxton Malloy DPM;  Location:  MAIN OR;  Service: Podiatry   • ND GASTROCNEMIUS RECESSION Right 09/13/2023    Procedure: RECESSION GASTROCNEMIUS OPEN;  Surgeon: Paxton Malloy DPM;  Location:  MAIN OR;  Service: Podiatry     Family History   Problem Relation Age of Onset   • Hypertension Mother    • Hypertension Maternal Grandmother    • No Known Problems Maternal Grandfather    • Breast cancer Maternal Great-Grandmother    • Anesthesia problems Neg Hx    • Ovarian cancer Neg Hx    • Colon cancer Neg Hx      Social History     Tobacco Use   • Smoking status: Never   • Smokeless tobacco: Never   • Tobacco comments:     Never a smoker or use of any tobacco products    Vaping Use   • Vaping status: Never Used   Substance and Sexual Activity   •  "Alcohol use: Yes     Comment: social -every 2-3 months, only 1 drink   • Drug use: Never     Comment: Denies any drug use per pt   • Sexual activity: Yes     Partners: Male     Birth control/protection: Male Sterilization     Comment: lifetime partners: 5; current partner: 2009     Current Outpatient Medications on File Prior to Visit   Medication Sig   • amLODIPine (NORVASC) 5 mg tablet Take 1 tablet (5 mg total) by mouth daily   • hydrOXYzine HCL (ATARAX) 10 mg tablet Take 1 tablet (10 mg total) by mouth daily as needed (sleep)   • amitriptyline (ELAVIL) 10 mg tablet Take 1 tablet (10 mg total) by mouth daily at bedtime (Patient not taking: Reported on 3/20/2025)   • diclofenac (VOLTAREN) 75 mg EC tablet Take 1 tablet (75 mg total) by mouth 2 (two) times a day for 14 days   • hydrOXYzine pamoate (VISTARIL) 25 mg capsule Take 1 capsule (25 mg total) by mouth daily at bedtime as needed (insomnia) (Patient not taking: Reported on 3/20/2025)   • phentermine (ADIPEX-P) 37.5 MG tablet Take 1 tablet (37.5 mg total) by mouth in the morning (Patient not taking: Reported on 3/20/2025)     Allergies   Allergen Reactions   • Dipyrone Other (See Comments)     Facial swelling. NB: this is a NSAID that causes agranulcytosis and is not used anymore.   • Lisinopril Cough     Immunization History   Administered Date(s) Administered   • COVID-19 J&J (Bitmenu) vaccine 0.5 mL 04/06/2021   • COVID-19 PFIZER VACCINE 0.3 ML IM 12/30/2021   • HPV9 07/07/2023   • Hep B, adult 05/06/2023, 06/27/2023   • INFLUENZA 11/12/2012, 10/15/2013, 11/13/2014, 10/14/2015, 10/14/2015, 10/08/2022   • Influenza Recombinant Preservative Free Im 08/03/2024   • Influenza, injectable, quadrivalent, preservative free 0.5 mL 09/01/2020   • Tdap 06/20/2022     Objective   /82 (BP Location: Left arm, Patient Position: Sitting, Cuff Size: Standard)   Pulse 82   Temp (!) 96.9 °F (36.1 °C) (Tympanic)   Resp 16   Ht 5' 5\" (1.651 m)   Wt 81.3 kg (179 lb 3.2 " oz)   SpO2 98%   BMI 29.82 kg/m²     Physical Exam  Vitals and nursing note reviewed.   Constitutional:       General: She is not in acute distress.     Appearance: Normal appearance. She is well-developed. She is not ill-appearing.   HENT:      Head: Normocephalic and atraumatic.      Right Ear: Tympanic membrane, ear canal and external ear normal.      Left Ear: Tympanic membrane, ear canal and external ear normal.      Nose: Nose normal.      Mouth/Throat:      Mouth: Mucous membranes are moist.   Eyes:      Conjunctiva/sclera: Conjunctivae normal.      Pupils: Pupils are equal, round, and reactive to light.   Cardiovascular:      Rate and Rhythm: Normal rate and regular rhythm.      Heart sounds: Normal heart sounds.   Pulmonary:      Effort: Pulmonary effort is normal.      Breath sounds: Normal breath sounds.   Abdominal:      General: Bowel sounds are normal.      Palpations: Abdomen is soft.   Musculoskeletal:         General: Normal range of motion.      Cervical back: Normal range of motion.   Lymphadenopathy:      Cervical: No cervical adenopathy.   Skin:     General: Skin is warm and dry.   Neurological:      Mental Status: She is alert and oriented to person, place, and time.   Psychiatric:         Mood and Affect: Mood normal.         Behavior: Behavior normal.

## 2025-03-20 NOTE — ASSESSMENT & PLAN NOTE
- Reviewed immunizations and screenings.  - UTD with dental, vision, and GYN exams.  - Mammogram due in July.  - UTD with colonoscopy.

## 2025-03-20 NOTE — ASSESSMENT & PLAN NOTE
- Has gained a few pounds since last visit.  - Tried phentermine but couldn't tolerate due to side effects.  - GLP-1s not covered by her insurance.  - She was referred to Weight Management.   Orders:  •  Ambulatory Referral to Weight Management; Future

## 2025-03-20 NOTE — ASSESSMENT & PLAN NOTE
Component      Latest Ref Rng 2/28/2024   Cholesterol      See Comment mg/dL 194    Triglycerides      See Comment mg/dL 125    HDL      >=50 mg/dL 46 (L)    LDL Calculated      0 - 100 mg/dL 123 (H)     - Not well controlled.  - Encourage healthy diet and regular exercise.  - Will continue to monitor fasting lipid panel.

## 2025-03-31 ENCOUNTER — TELEPHONE (OUTPATIENT)
Age: 45
End: 2025-03-31

## 2025-03-31 NOTE — TELEPHONE ENCOUNTER
Patient would like to schedule a MWM consult at [facility] with Dr. Scott. She would need a 60-minute appointment. I offered her 6/17/25 at 1:00 p.m. because there is a follow-up and new patient slot back to back, but was unable to schedule it. I appreciate your help.

## 2025-03-31 NOTE — TELEPHONE ENCOUNTER
Pt called trying to schedule her weight management appt.  Willy transferred her to weight management [facility] to assist

## 2025-04-01 ENCOUNTER — TELEPHONE (OUTPATIENT)
Dept: BARIATRICS | Facility: CLINIC | Age: 45
End: 2025-04-01

## 2025-04-19 PROBLEM — Z00.00 HEALTHCARE MAINTENANCE: Status: RESOLVED | Noted: 2025-03-20 | Resolved: 2025-04-19

## 2025-06-17 ENCOUNTER — OFFICE VISIT (OUTPATIENT)
Dept: BARIATRICS | Facility: CLINIC | Age: 45
End: 2025-06-17
Payer: COMMERCIAL

## 2025-06-17 VITALS
WEIGHT: 182.4 LBS | SYSTOLIC BLOOD PRESSURE: 130 MMHG | HEIGHT: 64 IN | BODY MASS INDEX: 31.14 KG/M2 | RESPIRATION RATE: 16 BRPM | HEART RATE: 83 BPM | TEMPERATURE: 98.5 F | DIASTOLIC BLOOD PRESSURE: 78 MMHG

## 2025-06-17 DIAGNOSIS — E66.09 CLASS 1 OBESITY DUE TO EXCESS CALORIES WITH BODY MASS INDEX (BMI) OF 34.0 TO 34.9 IN ADULT: Primary | ICD-10-CM

## 2025-06-17 DIAGNOSIS — I10 HYPERTENSION: ICD-10-CM

## 2025-06-17 DIAGNOSIS — E66.811 CLASS 1 OBESITY DUE TO EXCESS CALORIES WITH BODY MASS INDEX (BMI) OF 34.0 TO 34.9 IN ADULT: Primary | ICD-10-CM

## 2025-06-17 PROCEDURE — 99244 OFF/OP CNSLTJ NEW/EST MOD 40: CPT | Performed by: STUDENT IN AN ORGANIZED HEALTH CARE EDUCATION/TRAINING PROGRAM

## 2025-06-17 RX ORDER — PHENTERMINE HYDROCHLORIDE 15 MG/1
15 CAPSULE ORAL EVERY MORNING
Qty: 90 CAPSULE | Refills: 1 | Status: SHIPPED | OUTPATIENT
Start: 2025-06-17

## 2025-06-17 NOTE — PROGRESS NOTES
Assessment & Plan  Class 1 obesity     Current weight: 182    Medication: Start Phentermine 15 mg     Discussed options of [facility]-Intensive Lifestyle Intervention Program, VLCD and Conservative Program. Patient may schedule with dietitian for meal planning     Patient understands the importance of making lifestyle changes as recommended below to aid in weight loss     Dietary Recommendations:  Recommend to avoid skipping any meals. Adequate amount of Macronutrients (minimal 3 meals a day) is necessary to help improve metabolism, satiety and allow for better portion control by decreasing Ghrelin (hunger hormone). Lack of hunger can be suppressed by a hormone called Leptin (full hormone) which can occur from a previous meal or caffeine intake    Protein intake throughout the day can help promote satiety and is necessary for muscle growth/repair    Carbohydrates are essential as it is the vital source of fuel for daily activities. energy, cell function, nutrient absorption, and hormone production.     Fats: Essential vitamins like A, D, and E, support cell growth, function and are necessary for nutrient absorption to support your organs     Fluid intake which is at least half your body weight in ounces is necessary to help control cravings (decreasing confusion for appetite vs water deprivation) as the human body is made up of 50-70% of Fluids. If there is a diversion for water alone, would recommend flavored water (example-splash of lemonade or ice tea) to help promote compliance. Fluids include Teas, water, flavored water, seltzer water, coffee, shakes    Metabolism:    Metabolism can also be promoted by macronutrient intake and increased muscle weight via thermogenesis      Daily Calorie Needs: Recommend to take into account any fluid losses and calories burned via increased activity levels as daily calories may need to be adjusted     Weight check: Weights can fluctuate depending on fluid shifts vs what  "foods are consumed prior to checking your weight    Recent notes, labs and records were reviewed. Total time with chart review and with the patient: 45 min            Return in about 3 months (around 9/17/2025) for followup .   ______________________________________________________________________        Subjective:     Chief Complaint   Patient presents with    Consult     Mwm Consult: waist: 37in  sb:has sleep apnea cpap       HPI: 45 y.o. female with pmh of HTN, sleep apnea presents to the weight management clinic for consultation.     Wt Loss History:   Onset: 10 years   Low carb, healthy choices, vegetables   -Phentermine 37.5      Dietary Regimen:  Breakfast: egg, low bread    Lunch: Salads, rice, meat   Dinner              Cravings: sweets- ice cream   Fluids: half gallon     Lifestyle hx:  Occupation: [employer]     Review Of Systems:  General: No pallor, no weakness   Pulmonary: Negative for shortness of breath  Chest: negative for chest pain  Gastrointestinal:  Negative for abdominal pain, diarrhea   Psychiatric/Behavioral:  Negative for behavioral problems, confusion, dysphoric mood and hallucinations.    All other systems reviewed and are negative.     Objective:  /78   Pulse 83   Temp 98.5 °F (36.9 °C)   Resp 16   Ht 5' 4.25\" (1.632 m)   Wt 82.7 kg (182 lb 6.4 oz)   LMP 05/19/2025 (Approximate)   BMI 31.07 kg/m²     Wt Readings from Last 30 Encounters:   06/17/25 82.7 kg (182 lb 6.4 oz)   03/20/25 81.3 kg (179 lb 3.2 oz)   02/20/25 78.5 kg (173 lb)   01/23/25 78.7 kg (173 lb 6.4 oz)   12/04/24 80.5 kg (177 lb 6.4 oz)   09/11/24 77.6 kg (171 lb)   07/17/24 75.3 kg (166 lb)   07/10/24 72.6 kg (160 lb)   06/24/24 71.2 kg (157 lb)   05/22/24 74.1 kg (163 lb 6.4 oz)   04/24/24 71.7 kg (158 lb)   03/20/24 72.6 kg (160 lb)   02/21/24 72.1 kg (159 lb)   02/05/24 73.9 kg (162 lb 14.7 oz)   09/01/23 72.1 kg (159 lb)   12/15/20 68 kg (150 lb)       Physical Exam  Constitutional:       General: No acute " distress.  Well-nourished  HENT:      Head: Normocephalic and atraumatic.   Eyes:      Extraocular Movements: Extraocular movements intact.      Conjunctiva/pupils: Conjunctivae normal. Pupils are equal, round  Pulmonary:      Effort: Pulmonary effort is normal. No labored breathing   Neurological:      General: No focal deficit present.  AO x 3     Mental Status: Alert and oriented to person, place, and time. Mental status is at baseline.   Psychiatric:         Mood and Affect: Mood normal.         Behavior: Behavior normal.     Labs and Imaging  Recent labs and imaging have been personally reviewed.

## 2025-06-23 ENCOUNTER — TELEPHONE (OUTPATIENT)
Dept: BARIATRICS | Facility: CLINIC | Age: 45
End: 2025-06-23

## 2025-06-23 NOTE — TELEPHONE ENCOUNTER
PA for phentermine 15mg DENIED    Reason:(Screenshot if applicable) Plan exclusion no weight loss treatments covered at all         Message sent to office clinical pool Yes    Denial letter scanned into Media Yes      **Please follow up with your patient regarding denial and next steps**

## 2025-07-06 DIAGNOSIS — I10 HYPERTENSION, UNSPECIFIED TYPE: ICD-10-CM

## 2025-07-08 RX ORDER — AMLODIPINE BESYLATE 5 MG/1
5 TABLET ORAL DAILY
Qty: 90 TABLET | Refills: 0 | Status: SHIPPED | OUTPATIENT
Start: 2025-07-08

## 2025-08-08 ENCOUNTER — HOSPITAL ENCOUNTER (OUTPATIENT)
Dept: RADIOLOGY | Facility: IMAGING CENTER | Age: 45
End: 2025-08-08
Payer: COMMERCIAL

## (undated) DEVICE — SUT VICRYL 3-0 SH 27 IN J416H

## (undated) DEVICE — CAST PADDING 4 IN SYNTHETIC NON-STRL

## (undated) DEVICE — STOCKINETTE 2P PREROLLD 6X60

## (undated) DEVICE — INTENDED FOR TISSUE SEPARATION, AND OTHER PROCEDURES THAT REQUIRE A SHARP SURGICAL BLADE TO PUNCTURE OR CUT.: Brand: BARD-PARKER SAFETY BLADES SIZE 15, STERILE

## (undated) DEVICE — PADDING CAST 6IN COTTON STRL

## (undated) DEVICE — CURITY NON-ADHERENT STRIPS: Brand: CURITY

## (undated) DEVICE — KERLIX BANDAGE ROLL: Brand: KERLIX

## (undated) DEVICE — ACE WRAP 4 IN UNSTERILE

## (undated) DEVICE — INTENDED FOR TISSUE SEPARATION, AND OTHER PROCEDURES THAT REQUIRE A SHARP SURGICAL BLADE TO PUNCTURE OR CUT.: Brand: BARD-PARKER ® SAFETYLOCK CARBON RIB-BACK BLADES

## (undated) DEVICE — PENCIL ELECTROSURG E-Z CLEAN -0035H

## (undated) DEVICE — BETHLEHEM UNIVERSAL  MIONR EXT: Brand: CARDINAL HEALTH

## (undated) DEVICE — DISPOSABLE OR TOWEL: Brand: CARDINAL HEALTH

## (undated) DEVICE — ACE WRAP 6 IN UNSTERILE

## (undated) DEVICE — GLOVE SRG LF STRL BGL SKNSNS 7 PF

## (undated) DEVICE — SINGLE PORT MANIFOLD: Brand: NEPTUNE 2

## (undated) DEVICE — STANDARD SURGICAL GOWN, L: Brand: CONVERTORS

## (undated) DEVICE — POV-IOD SOLUTION 4OZ BT

## (undated) DEVICE — CHLORAPREP HI-LITE 26ML ORANGE

## (undated) DEVICE — STERILE POLYISOPRENE POWDER-FREE SURGICAL GLOVES: Brand: PROTEXIS

## (undated) DEVICE — STOCKINETTE 2P PREROLLD 4X48

## (undated) DEVICE — SCD SEQUENTIAL COMPRESSION COMFORT SLEEVE MEDIUM KNEE LENGTH: Brand: KENDALL SCD

## (undated) DEVICE — NEEDLE 25G X 1 1/2

## (undated) DEVICE — STERILE POLYISOPRENE POWDER-FREE SURGICAL GLOVES WITH EMOLLIENT COATING: Brand: PROTEXIS

## (undated) DEVICE — SUT ETHILON 4-0 PS-2 18 IN 1667H

## (undated) DEVICE — SYRINGE 10ML LL

## (undated) DEVICE — CUFF TOURNIQUET 30 X 4 IN QUICK CONNECT DISP 1BLA

## (undated) DEVICE — NEEDLE BLUNT 18 G X 1 1/2IN